# Patient Record
Sex: FEMALE
[De-identification: names, ages, dates, MRNs, and addresses within clinical notes are randomized per-mention and may not be internally consistent; named-entity substitution may affect disease eponyms.]

---

## 2017-10-04 ENCOUNTER — CHARTING TRANS (OUTPATIENT)
Dept: OTHER | Age: 22
End: 2017-10-04

## 2017-10-05 ENCOUNTER — CHARTING TRANS (OUTPATIENT)
Dept: OTHER | Age: 22
End: 2017-10-05

## 2017-10-05 LAB
C TRACH RRNA SPEC QL NAA+PROBE: NEGATIVE
C TRACH RRNA SPEC QL NAA+PROBE: NEGATIVE
N GONORRHOEA RRNA SPEC QL NAA+PROBE: NEGATIVE
N GONORRHOEA RRNA SPEC QL NAA+PROBE: NEGATIVE
SPECIMEN SOURCE: NORMAL
SPECIMEN SOURCE: NORMAL

## 2017-10-09 ENCOUNTER — CHARTING TRANS (OUTPATIENT)
Dept: OTHER | Age: 22
End: 2017-10-09

## 2017-10-09 LAB — CULTURE STREP GRP A (STTH) HL: NORMAL

## 2017-10-10 ENCOUNTER — LAB SERVICES (OUTPATIENT)
Dept: OTHER | Age: 22
End: 2017-10-10

## 2017-10-11 ENCOUNTER — CHARTING TRANS (OUTPATIENT)
Dept: OTHER | Age: 22
End: 2017-10-11

## 2017-10-12 LAB
ALBUMIN SERPL-MCNC: 4.1 G/DL (ref 3.6–5.1)
ALBUMIN/GLOB SERPL: 1.3 (ref 1–2.4)
ALP SERPL-CCNC: 35 UNITS/L (ref 45–117)
ALT SERPL-CCNC: 14 UNITS/L
ANION GAP SERPL CALC-SCNC: 12 MMOL/L (ref 10–20)
AST SERPL-CCNC: 13 UNITS/L
BASOPHILS # BLD: 0 K/MCL (ref 0–0.3)
BASOPHILS NFR BLD: 0 %
BILIRUB SERPL-MCNC: 0.3 MG/DL (ref 0.2–1)
BUN SERPL-MCNC: 9 MG/DL (ref 6–20)
BUN/CREAT SERPL: 15 (ref 7–25)
CALCIUM SERPL-MCNC: 9.7 MG/DL (ref 8.4–10.2)
CHLORIDE SERPL-SCNC: 107 MMOL/L (ref 98–107)
CHOLEST SERPL-MCNC: 193 MG/DL
CHOLEST/HDLC SERPL: 2.9
CO2 SERPL-SCNC: 25 MMOL/L (ref 21–32)
CREAT SERPL-MCNC: 0.61 MG/DL (ref 0.51–0.95)
DIFFERENTIAL METHOD BLD: ABNORMAL
EOSINOPHIL # BLD: 0.1 K/MCL (ref 0.1–0.5)
EOSINOPHIL NFR BLD: 1 %
ERYTHROCYTE [DISTWIDTH] IN BLOOD: 13.6 % (ref 11–15)
FERRITIN SERPL-MCNC: 14 NG/ML (ref 8–252)
GLOBULIN SER-MCNC: 3.2 G/DL (ref 2–4)
GLUCOSE SERPL-MCNC: 92 MG/DL (ref 65–99)
HDLC SERPL-MCNC: 66 MG/DL
HEMATOCRIT: 37.8 % (ref 36–46.5)
HEMOGLOBIN: 12.2 G/DL (ref 12–15.5)
IMM RETICS NFR: 7 % (ref 1.5–16)
IRON SATN MFR SERPL: 23 % (ref 15–45)
IRON SERPL-MCNC: 69 MCG/DL (ref 50–170)
LDLC SERPL CALC-MCNC: 110 MG/DL
LENGTH OF FAST TIME PATIENT: 12 HRS
LENGTH OF FAST TIME PATIENT: 12 HRS
LYMPHOCYTES # BLD: 1.6 K/MCL (ref 1–4.8)
LYMPHOCYTES NFR BLD: 24 %
MEAN CORPUSCULAR HEMOGLOBIN: 25.2 PG (ref 26–34)
MEAN CORPUSCULAR HGB CONC: 32.3 G/DL (ref 32–36.5)
MEAN CORPUSCULAR VOLUME: 77.9 FL (ref 78–100)
MONOCYTES # BLD: 0.4 K/MCL (ref 0.3–0.9)
MONOCYTES NFR BLD: 7 %
NEUTROPHILS # BLD: 4.4 K/MCL (ref 1.8–7.7)
NEUTROPHILS NFR BLD: 68 %
NONHDLC SERPL-MCNC: 127 MG/DL
PLATELET COUNT: 288 K/MCL (ref 140–450)
POTASSIUM SERPL-SCNC: 4.7 MMOL/L (ref 3.4–5.1)
RED CELL COUNT: 4.85 MIL/MCL (ref 4–5.2)
RETICS #: 60 K/MCL (ref 10–120)
RETICS/RBC NFR: 1.2 % (ref 0.3–2.5)
SODIUM SERPL-SCNC: 139 MMOL/L (ref 135–145)
TIBC SERPL-MCNC: 294 MCG/DL (ref 250–450)
TOTAL PROTEIN: 7.3 G/DL (ref 6.4–8.2)
TRIGL SERPL-MCNC: 85 MG/DL
TSH SERPL-ACNC: 1.35 MCUNITS/ML (ref 0.35–5)
WHITE BLOOD COUNT: 6.5 K/MCL (ref 4.2–11)

## 2017-11-13 ENCOUNTER — CHARTING TRANS (OUTPATIENT)
Dept: OTHER | Age: 22
End: 2017-11-13

## 2017-11-14 ENCOUNTER — MYAURORA ACCOUNT LINK (OUTPATIENT)
Dept: OTHER | Age: 22
End: 2017-11-14

## 2017-11-14 ENCOUNTER — CHARTING TRANS (OUTPATIENT)
Dept: OTHER | Age: 22
End: 2017-11-14

## 2018-09-24 ENCOUNTER — LAB SERVICES (OUTPATIENT)
Dept: OTHER | Age: 23
End: 2018-09-24

## 2018-09-24 ENCOUNTER — CHARTING TRANS (OUTPATIENT)
Dept: OTHER | Age: 23
End: 2018-09-24

## 2018-09-24 ENCOUNTER — MYAURORA ACCOUNT LINK (OUTPATIENT)
Dept: OTHER | Age: 23
End: 2018-09-24

## 2018-09-24 LAB
BILIRUBIN: NORMAL
GLUCOSE U: NORMAL
KETONES: NORMAL
LEUKOCYTES: NORMAL
NITRITE: NORMAL
OCCULT BLOOD: NORMAL
PH: 6
PROTEIN: NORMAL
URINE SPEC GRAVITY: 1
UROBILINOGEN: 0.2

## 2018-09-25 ENCOUNTER — CHARTING TRANS (OUTPATIENT)
Dept: OTHER | Age: 23
End: 2018-09-25

## 2018-09-25 LAB
ALBUMIN SERPL-MCNC: 4.2 G/DL (ref 3.6–5.1)
ALBUMIN/GLOB SERPL: 1.4 (ref 1–2.4)
ALP SERPL-CCNC: 38 UNITS/L (ref 45–117)
ALT SERPL-CCNC: 15 UNITS/L
ANION GAP SERPL CALC-SCNC: 14 MMOL/L (ref 10–20)
AST SERPL-CCNC: 12 UNITS/L
BASOPHILS # BLD: 0 K/MCL (ref 0–0.3)
BASOPHILS NFR BLD: 0 %
BILIRUB SERPL-MCNC: 0.4 MG/DL (ref 0.2–1)
BUN SERPL-MCNC: 6 MG/DL (ref 6–20)
BUN/CREAT SERPL: 10 (ref 7–25)
CALCIUM SERPL-MCNC: 9.5 MG/DL (ref 8.4–10.2)
CHLORIDE SERPL-SCNC: 105 MMOL/L (ref 98–107)
CO2 SERPL-SCNC: 24 MMOL/L (ref 21–32)
CREAT SERPL-MCNC: 0.63 MG/DL (ref 0.51–0.95)
DIFFERENTIAL METHOD BLD: ABNORMAL
EOSINOPHIL # BLD: 0 K/MCL (ref 0.1–0.5)
EOSINOPHIL NFR BLD: 1 %
ERYTHROCYTE [DISTWIDTH] IN BLOOD: 13.2 % (ref 11–15)
GLOBULIN SER-MCNC: 2.9 G/DL (ref 2–4)
GLUCOSE SERPL-MCNC: 93 MG/DL (ref 65–99)
HEMATOCRIT: 38.6 % (ref 36–46.5)
HEMOGLOBIN: 11.8 G/DL (ref 12–15.5)
IMM GRANULOCYTES # BLD AUTO: 0 K/MCL (ref 0–0.2)
IMM GRANULOCYTES NFR BLD: 0 %
LENGTH OF FAST TIME PATIENT: 12 HRS
LYMPHOCYTES # BLD: 1.3 K/MCL (ref 1–4.8)
LYMPHOCYTES NFR BLD: 27 %
MEAN CORPUSCULAR HEMOGLOBIN: 25.1 PG (ref 26–34)
MEAN CORPUSCULAR HGB CONC: 30.6 G/DL (ref 32–36.5)
MEAN CORPUSCULAR VOLUME: 82.1 FL (ref 78–100)
MONOCYTES # BLD: 0.3 K/MCL (ref 0.3–0.9)
MONOCYTES NFR BLD: 5 %
NEUTROPHILS # BLD: 3.3 K/MCL (ref 1.8–7.7)
NEUTROPHILS NFR BLD: 67 %
NRBC (NRBCRE): 0 /100 WBC
PLATELET COUNT: 289 K/MCL (ref 140–450)
POTASSIUM SERPL-SCNC: 4 MMOL/L (ref 3.4–5.1)
RED CELL COUNT: 4.7 MIL/MCL (ref 4–5.2)
SODIUM SERPL-SCNC: 139 MMOL/L (ref 135–145)
TOTAL PROTEIN: 7.1 G/DL (ref 6.4–8.2)
WHITE BLOOD COUNT: 5 K/MCL (ref 4.2–11)

## 2018-11-02 VITALS
SYSTOLIC BLOOD PRESSURE: 120 MMHG | TEMPERATURE: 98.4 F | HEIGHT: 61 IN | WEIGHT: 125.22 LBS | HEART RATE: 92 BPM | RESPIRATION RATE: 18 BRPM | DIASTOLIC BLOOD PRESSURE: 72 MMHG | BODY MASS INDEX: 23.64 KG/M2 | OXYGEN SATURATION: 98 %

## 2018-11-02 VITALS
TEMPERATURE: 97.8 F | DIASTOLIC BLOOD PRESSURE: 72 MMHG | WEIGHT: 129.41 LBS | HEART RATE: 81 BPM | SYSTOLIC BLOOD PRESSURE: 116 MMHG | HEIGHT: 61 IN | BODY MASS INDEX: 24.43 KG/M2

## 2018-11-27 VITALS
BODY MASS INDEX: 24.55 KG/M2 | WEIGHT: 130 LBS | HEIGHT: 61 IN | OXYGEN SATURATION: 99 % | TEMPERATURE: 97.5 F | HEART RATE: 98 BPM | RESPIRATION RATE: 18 BRPM | SYSTOLIC BLOOD PRESSURE: 116 MMHG | DIASTOLIC BLOOD PRESSURE: 74 MMHG

## 2019-01-01 ENCOUNTER — EXTERNAL RECORD (OUTPATIENT)
Dept: HEALTH INFORMATION MANAGEMENT | Facility: OTHER | Age: 24
End: 2019-01-01

## 2021-02-03 ENCOUNTER — HOSPITAL ENCOUNTER (EMERGENCY)
Facility: HOSPITAL | Age: 26
Discharge: HOME | End: 2021-02-03
Attending: EMERGENCY MEDICINE
Payer: COMMERCIAL

## 2021-02-03 ENCOUNTER — APPOINTMENT (OUTPATIENT)
Dept: CT IMAGING | Facility: HOSPITAL | Age: 26
End: 2021-02-03
Attending: EMERGENCY MEDICINE
Payer: COMMERCIAL

## 2021-02-03 VITALS
HEART RATE: 84 BPM | SYSTOLIC BLOOD PRESSURE: 120 MMHG | TEMPERATURE: 98 F | BODY MASS INDEX: 24.48 KG/M2 | HEIGHT: 62 IN | DIASTOLIC BLOOD PRESSURE: 68 MMHG | OXYGEN SATURATION: 98 % | RESPIRATION RATE: 16 BRPM | WEIGHT: 133 LBS

## 2021-02-03 DIAGNOSIS — A09 INFECTIOUS ENTERITIS, UNSPECIFIED INFECTIOUS AGENT: Primary | ICD-10-CM

## 2021-02-03 DIAGNOSIS — D72.829 LEUKOCYTOSIS, UNSPECIFIED TYPE: ICD-10-CM

## 2021-02-03 DIAGNOSIS — K52.9 GASTROENTERITIS: ICD-10-CM

## 2021-02-03 DIAGNOSIS — Z20.822 COVID-19 VIRUS NOT DETECTED: ICD-10-CM

## 2021-02-03 LAB
ALBUMIN SERPL-MCNC: 4.6 G/DL (ref 3.4–5)
ALP LIVER SERPL-CCNC: 54 U/L
ALT SERPL-CCNC: 34 U/L
ANION GAP SERPL CALC-SCNC: 9 MMOL/L (ref 0–18)
AST SERPL-CCNC: 19 U/L (ref 15–37)
B-HCG UR QL: NEGATIVE
BASOPHILS # BLD AUTO: 0.05 X10(3) UL (ref 0–0.2)
BASOPHILS NFR BLD AUTO: 0.2 %
BILIRUB DIRECT SERPL-MCNC: <0.1 MG/DL (ref 0–0.2)
BILIRUB SERPL-MCNC: 0.3 MG/DL (ref 0.1–2)
BILIRUB UR QL: NEGATIVE
BUN BLD-MCNC: 15 MG/DL (ref 7–18)
BUN/CREAT SERPL: 17.9 (ref 10–20)
CALCIUM BLD-MCNC: 9.8 MG/DL (ref 8.5–10.1)
CHLORIDE SERPL-SCNC: 108 MMOL/L (ref 98–112)
CO2 SERPL-SCNC: 19 MMOL/L (ref 21–32)
COLOR UR: YELLOW
CREAT BLD-MCNC: 0.84 MG/DL
DEPRECATED RDW RBC AUTO: 38 FL (ref 35.1–46.3)
EOSINOPHIL # BLD AUTO: 0.02 X10(3) UL (ref 0–0.7)
EOSINOPHIL NFR BLD AUTO: 0.1 %
ERYTHROCYTE [DISTWIDTH] IN BLOOD BY AUTOMATED COUNT: 13.2 % (ref 11–15)
GLUCOSE BLD-MCNC: 172 MG/DL (ref 70–99)
GLUCOSE UR-MCNC: NEGATIVE MG/DL
HCT VFR BLD AUTO: 45.6 %
HGB BLD-MCNC: 14.4 G/DL
HYALINE CASTS #/AREA URNS AUTO: 4 /LPF
IMM GRANULOCYTES # BLD AUTO: 0.08 X10(3) UL (ref 0–1)
IMM GRANULOCYTES NFR BLD: 0.4 %
KETONES UR-MCNC: NEGATIVE MG/DL
LIPASE SERPL-CCNC: 550 U/L (ref 73–393)
LYMPHOCYTES # BLD AUTO: 2.73 X10(3) UL (ref 1–4)
LYMPHOCYTES NFR BLD AUTO: 12 %
M PROTEIN MFR SERPL ELPH: 9.1 G/DL (ref 6.4–8.2)
MCH RBC QN AUTO: 25.3 PG (ref 26–34)
MCHC RBC AUTO-ENTMCNC: 31.6 G/DL (ref 31–37)
MCV RBC AUTO: 80.1 FL
MONOCYTES # BLD AUTO: 0.82 X10(3) UL (ref 0.1–1)
MONOCYTES NFR BLD AUTO: 3.6 %
NEUTROPHILS # BLD AUTO: 18.96 X10 (3) UL (ref 1.5–7.7)
NEUTROPHILS # BLD AUTO: 18.96 X10(3) UL (ref 1.5–7.7)
NEUTROPHILS NFR BLD AUTO: 83.7 %
NITRITE UR QL STRIP.AUTO: NEGATIVE
OSMOLALITY SERPL CALC.SUM OF ELEC: 287 MOSM/KG (ref 275–295)
PH UR: 5 [PH] (ref 5–8)
PLATELET # BLD AUTO: 406 10(3)UL (ref 150–450)
POTASSIUM SERPL-SCNC: 3.5 MMOL/L (ref 3.5–5.1)
PROT UR-MCNC: 30 MG/DL
RBC # BLD AUTO: 5.69 X10(6)UL
RBC #/AREA URNS AUTO: 4 /HPF
SARS-COV-2 RNA RESP QL NAA+PROBE: NOT DETECTED
SODIUM SERPL-SCNC: 136 MMOL/L (ref 136–145)
SP GR UR STRIP: 1.03 (ref 1–1.03)
UROBILINOGEN UR STRIP-ACNC: <2
WBC # BLD AUTO: 22.7 X10(3) UL (ref 4–11)
WBC #/AREA URNS AUTO: 2 /HPF

## 2021-02-03 PROCEDURE — 85025 COMPLETE CBC W/AUTO DIFF WBC: CPT | Performed by: EMERGENCY MEDICINE

## 2021-02-03 PROCEDURE — 96361 HYDRATE IV INFUSION ADD-ON: CPT

## 2021-02-03 PROCEDURE — 74177 CT ABD & PELVIS W/CONTRAST: CPT | Performed by: EMERGENCY MEDICINE

## 2021-02-03 PROCEDURE — 81001 URINALYSIS AUTO W/SCOPE: CPT | Performed by: EMERGENCY MEDICINE

## 2021-02-03 PROCEDURE — 96375 TX/PRO/DX INJ NEW DRUG ADDON: CPT

## 2021-02-03 PROCEDURE — S0028 INJECTION, FAMOTIDINE, 20 MG: HCPCS | Performed by: EMERGENCY MEDICINE

## 2021-02-03 PROCEDURE — 80048 BASIC METABOLIC PNL TOTAL CA: CPT | Performed by: EMERGENCY MEDICINE

## 2021-02-03 PROCEDURE — 99284 EMERGENCY DEPT VISIT MOD MDM: CPT

## 2021-02-03 PROCEDURE — 85060 BLOOD SMEAR INTERPRETATION: CPT | Performed by: EMERGENCY MEDICINE

## 2021-02-03 PROCEDURE — 81025 URINE PREGNANCY TEST: CPT

## 2021-02-03 PROCEDURE — 96374 THER/PROPH/DIAG INJ IV PUSH: CPT

## 2021-02-03 PROCEDURE — 83690 ASSAY OF LIPASE: CPT | Performed by: EMERGENCY MEDICINE

## 2021-02-03 PROCEDURE — 80076 HEPATIC FUNCTION PANEL: CPT | Performed by: EMERGENCY MEDICINE

## 2021-02-03 RX ORDER — ONDANSETRON 2 MG/ML
4 INJECTION INTRAMUSCULAR; INTRAVENOUS ONCE
Status: COMPLETED | OUTPATIENT
Start: 2021-02-03 | End: 2021-02-03

## 2021-02-03 RX ORDER — FAMOTIDINE 10 MG/ML
20 INJECTION, SOLUTION INTRAVENOUS ONCE
Status: COMPLETED | OUTPATIENT
Start: 2021-02-03 | End: 2021-02-03

## 2021-02-03 RX ORDER — CIPROFLOXACIN 500 MG/1
500 TABLET, FILM COATED ORAL 2 TIMES DAILY
Qty: 14 TABLET | Refills: 0 | Status: SHIPPED | OUTPATIENT
Start: 2021-02-03 | End: 2021-02-10

## 2021-02-03 RX ORDER — ONDANSETRON 4 MG/1
4 TABLET, ORALLY DISINTEGRATING ORAL EVERY 4 HOURS PRN
Qty: 10 TABLET | Refills: 0 | Status: SHIPPED | OUTPATIENT
Start: 2021-02-03 | End: 2021-02-10

## 2021-02-03 RX ORDER — METRONIDAZOLE 500 MG/1
500 TABLET ORAL 3 TIMES DAILY
Qty: 21 TABLET | Refills: 0 | Status: SHIPPED | OUTPATIENT
Start: 2021-02-03 | End: 2021-02-10

## 2021-02-03 NOTE — ED PROVIDER NOTES
Patient Seen in: [facility] AND [facility] Emergency Department    History   Patient presents with:  Nausea/Vomiting/Diarrhea      HPI     29-year-old female presents the ER with complaint of epigastric pain, nausea, vomiting, diarrhea for the past 2 days.   Zamzam the room.  Personal protective equipment including droplet mask, eye protection, and gloves were worn throughout the duration of the exam.  Handwashing was performed prior to and after the exam.  Stethoscope and any equipment used during my examination was (*)     Leukocyte Esterase Urine Trace (*)     RBC URINE 4 (*)     Bacteria Urine Few (*)     All other components within normal limits   CBC W/ DIFFERENTIAL - Abnormal; Notable for the following components:    WBC 22.7 (*)     RBC 5.69 (*)     MCH 25.3 (* reviewed and interpreted all ED vitals.   I also personally reviewed all labs and imaging if ordered    Pulse Ox: 100%, Room air, Normal     Monitor Interpretation:   normal sinus rhythm    Differential Diagnosis/ Diagnostic Considerations: Gastritis, pancr

## 2021-02-03 NOTE — ED INITIAL ASSESSMENT (HPI)
Pt arrive in ER per [facility] ambulance with c/o nausea/vomiting/diarrhea x 2 days, denies fever, received zofran odt in route

## 2021-02-03 NOTE — H&P
[address] [facility] [address] Gastroenterology                                                                                                  Clinic History and Physical     Pa apnea. Pertinent Family Hx:  - No family hx of esophageal, gastric or colon cancer  - No family history of IBD.     Prior endoscopies:  Denies    Social Hx:  - No smoking  - No etoh  - Denies illicit drug use   - LMP: 1/11/2021  - Occupation: RN - outpt erythema  BEHAVIOR/PSYCH:  negative for psychotic behavior      PHYSICAL EXAM:   Blood pressure 118/81, pulse 89, height 5' 2\" (1.575 m), weight 139 lb (63 kg), last menstrual period 01/11/2021, not currently breastfeeding.     Gen: patient appears comfort lack of evidence for pancreatitis.  + Leukocytosis without anemia. Given that she was recently started on antibiotics, it is difficult to determine how much of her symptoms are acutely infections versus chronic.   Given this, I would recommend continuing o

## 2021-02-04 ENCOUNTER — OFFICE VISIT (OUTPATIENT)
Dept: GASTROENTEROLOGY | Facility: CLINIC | Age: 26
End: 2021-02-04
Payer: COMMERCIAL

## 2021-02-04 VITALS
HEIGHT: 62 IN | WEIGHT: 139 LBS | BODY MASS INDEX: 25.58 KG/M2 | DIASTOLIC BLOOD PRESSURE: 81 MMHG | HEART RATE: 89 BPM | SYSTOLIC BLOOD PRESSURE: 118 MMHG

## 2021-02-04 DIAGNOSIS — R11.2 NAUSEA AND VOMITING, INTRACTABILITY OF VOMITING NOT SPECIFIED, UNSPECIFIED VOMITING TYPE: ICD-10-CM

## 2021-02-04 DIAGNOSIS — R10.13 EPIGASTRIC PAIN: Primary | ICD-10-CM

## 2021-02-04 DIAGNOSIS — R19.7 DIARRHEA, UNSPECIFIED TYPE: ICD-10-CM

## 2021-02-04 PROCEDURE — 3008F BODY MASS INDEX DOCD: CPT | Performed by: NURSE PRACTITIONER

## 2021-02-04 PROCEDURE — 99203 OFFICE O/P NEW LOW 30 MIN: CPT | Performed by: NURSE PRACTITIONER

## 2021-02-04 PROCEDURE — 3074F SYST BP LT 130 MM HG: CPT | Performed by: NURSE PRACTITIONER

## 2021-02-04 PROCEDURE — 3079F DIAST BP 80-89 MM HG: CPT | Performed by: NURSE PRACTITIONER

## 2021-02-04 RX ORDER — LEVONORGESTREL AND ETHINYL ESTRADIOL 0.1-0.02MG
1 KIT ORAL DAILY
COMMUNITY
Start: 2020-09-11 | End: 2021-02-08

## 2021-02-04 RX ORDER — LEVONORGESTREL AND ETHINYL ESTRADIOL 0.1-0.02MG
1 KIT ORAL DAILY
COMMUNITY
Start: 2020-12-22 | End: 2021-05-24 | Stop reason: ALTCHOICE

## 2021-02-04 NOTE — PATIENT INSTRUCTIONS
-Complete course of antibiotics per ED  -Start probiotics  -Contact the office in the next few weeks with a symptom update

## 2021-02-08 ENCOUNTER — OFFICE VISIT (OUTPATIENT)
Dept: INTERNAL MEDICINE CLINIC | Facility: CLINIC | Age: 26
End: 2021-02-08
Payer: COMMERCIAL

## 2021-02-08 VITALS
OXYGEN SATURATION: 99 % | HEIGHT: 62 IN | HEART RATE: 96 BPM | WEIGHT: 142 LBS | SYSTOLIC BLOOD PRESSURE: 120 MMHG | BODY MASS INDEX: 26.13 KG/M2 | DIASTOLIC BLOOD PRESSURE: 70 MMHG

## 2021-02-08 DIAGNOSIS — R06.02 SOB (SHORTNESS OF BREATH): ICD-10-CM

## 2021-02-08 DIAGNOSIS — R19.7 DIARRHEA, UNSPECIFIED TYPE: ICD-10-CM

## 2021-02-08 DIAGNOSIS — R74.8 ELEVATED LIPASE: ICD-10-CM

## 2021-02-08 DIAGNOSIS — Z00.00 ANNUAL PHYSICAL EXAM: Primary | ICD-10-CM

## 2021-02-08 DIAGNOSIS — I88.0 MESENTERIC ADENITIS: ICD-10-CM

## 2021-02-08 DIAGNOSIS — R14.0 ABDOMINAL BLOATING: ICD-10-CM

## 2021-02-08 DIAGNOSIS — R82.90 ABNORMAL URINALYSIS: ICD-10-CM

## 2021-02-08 PROCEDURE — 3078F DIAST BP <80 MM HG: CPT | Performed by: INTERNAL MEDICINE

## 2021-02-08 PROCEDURE — 99385 PREV VISIT NEW AGE 18-39: CPT | Performed by: INTERNAL MEDICINE

## 2021-02-08 PROCEDURE — 3008F BODY MASS INDEX DOCD: CPT | Performed by: INTERNAL MEDICINE

## 2021-02-08 PROCEDURE — 3074F SYST BP LT 130 MM HG: CPT | Performed by: INTERNAL MEDICINE

## 2021-02-08 NOTE — PROGRESS NOTES
Jessica Soto is a 31year old female.     Chief complaint: annual physical exam  HPI:     Jessica Soto is a 31year old female who presents for annual physical exam   Was in the ER recently    had vomiting   Felt Lethargic   Had diarrhea   Abdominal pain   Jason JasonAdrien negatives noted in the the HPI            EXAM:   /70   Pulse 96   Ht 5' 2\" (1.575 m)   Wt 142 lb (64.4 kg)   LMP 01/11/2021   SpO2 99%   BMI 25.97 kg/m²   GENERAL: well developed, well nourished,in no apparent distress  SKIN: no rashes,no suspiciou URINALYSIS WITH CULTURE REFLEX; Future  - EKG 12-LEAD; Future  - XR CHEST PA + LAT CHEST (CPT=71046); Future    3. Mesenteric adenitis  Reviewed   Continue antibiotics   Repeat abnormal blood work  - LIPASE; Future  - SED RATE, WESTERGREN (AUTOMATED);  F[place]u PROTEIN; Future  - HEMOGLOBIN A1C; Future  - CELIAC DISEASE SCREEN REFLEX; Future  - LIPID PANEL; Future  - CBC WITH DIFFERENTIAL WITH PLATELET;  Future  - URINALYSIS WITH CULTURE REFLEX; Future  - TSH W REFLEX TO FREE T4; Future  - GASTRO - INTERNAL  - URI

## 2021-02-16 ENCOUNTER — LAB ENCOUNTER (OUTPATIENT)
Dept: LAB | Facility: HOSPITAL | Age: 26
End: 2021-02-16
Attending: INTERNAL MEDICINE
Payer: COMMERCIAL

## 2021-02-16 ENCOUNTER — HOSPITAL ENCOUNTER (OUTPATIENT)
Dept: GENERAL RADIOLOGY | Facility: HOSPITAL | Age: 26
Discharge: HOME | End: 2021-02-16
Attending: INTERNAL MEDICINE
Payer: COMMERCIAL

## 2021-02-16 DIAGNOSIS — R06.02 SOB (SHORTNESS OF BREATH): ICD-10-CM

## 2021-02-16 DIAGNOSIS — R82.90 ABNORMAL URINALYSIS: ICD-10-CM

## 2021-02-16 DIAGNOSIS — I88.0 MESENTERIC ADENITIS: ICD-10-CM

## 2021-02-16 DIAGNOSIS — Z00.00 ANNUAL PHYSICAL EXAM: ICD-10-CM

## 2021-02-16 DIAGNOSIS — R14.0 ABDOMINAL BLOATING: ICD-10-CM

## 2021-02-16 DIAGNOSIS — M41.80 DEXTROSCOLIOSIS: ICD-10-CM

## 2021-02-16 DIAGNOSIS — R74.8 ELEVATED LIPASE: ICD-10-CM

## 2021-02-16 DIAGNOSIS — R19.7 DIARRHEA, UNSPECIFIED TYPE: ICD-10-CM

## 2021-02-16 DIAGNOSIS — R94.31 ABNORMAL EKG: ICD-10-CM

## 2021-02-16 PROCEDURE — 84443 ASSAY THYROID STIM HORMONE: CPT

## 2021-02-16 PROCEDURE — 83036 HEMOGLOBIN GLYCOSYLATED A1C: CPT

## 2021-02-16 PROCEDURE — 82784 ASSAY IGA/IGD/IGG/IGM EACH: CPT

## 2021-02-16 PROCEDURE — 93010 ELECTROCARDIOGRAM REPORT: CPT | Performed by: INTERNAL MEDICINE

## 2021-02-16 PROCEDURE — 36415 COLL VENOUS BLD VENIPUNCTURE: CPT

## 2021-02-16 PROCEDURE — 85025 COMPLETE CBC W/AUTO DIFF WBC: CPT

## 2021-02-16 PROCEDURE — 82728 ASSAY OF FERRITIN: CPT

## 2021-02-16 PROCEDURE — 85652 RBC SED RATE AUTOMATED: CPT

## 2021-02-16 PROCEDURE — 93005 ELECTROCARDIOGRAM TRACING: CPT

## 2021-02-16 PROCEDURE — 83516 IMMUNOASSAY NONANTIBODY: CPT

## 2021-02-16 PROCEDURE — 81001 URINALYSIS AUTO W/SCOPE: CPT

## 2021-02-16 PROCEDURE — 83540 ASSAY OF IRON: CPT

## 2021-02-16 PROCEDURE — 84466 ASSAY OF TRANSFERRIN: CPT

## 2021-02-16 PROCEDURE — 71046 X-RAY EXAM CHEST 2 VIEWS: CPT | Performed by: INTERNAL MEDICINE

## 2021-02-16 PROCEDURE — 80061 LIPID PANEL: CPT

## 2021-02-16 PROCEDURE — 83690 ASSAY OF LIPASE: CPT

## 2021-02-16 PROCEDURE — 86140 C-REACTIVE PROTEIN: CPT

## 2021-02-18 ENCOUNTER — TELEPHONE (OUTPATIENT)
Dept: INTERNAL MEDICINE CLINIC | Facility: CLINIC | Age: 26
End: 2021-02-18

## 2021-02-18 DIAGNOSIS — R94.31 ABNORMAL EKG: ICD-10-CM

## 2021-02-18 DIAGNOSIS — R06.02 SOB (SHORTNESS OF BREATH): ICD-10-CM

## 2021-02-18 DIAGNOSIS — M41.80 DEXTROSCOLIOSIS: ICD-10-CM

## 2021-02-18 DIAGNOSIS — R82.90 ABNORMAL URINALYSIS: Primary | ICD-10-CM

## 2021-02-18 NOTE — TELEPHONE ENCOUNTER
Wanted to understand what an unspecific T-wave abnormality means and why an Echo was ordered.   Explained that this abnormally could be cause by numerous things such as anxiety, hyperventilation, et al.  The echo will just give a better answer if there is s

## 2021-02-23 ENCOUNTER — PATIENT MESSAGE (OUTPATIENT)
Dept: INTERNAL MEDICINE CLINIC | Facility: CLINIC | Age: 26
End: 2021-02-23

## 2021-02-23 NOTE — TELEPHONE ENCOUNTER
From: Diana Medina  To: Nita Gonzales MD  Sent: 2/23/2021 3:01 AM CST  Subject: Prescription Question    Hi Doctor,    I was on a high dosage of antibiotics for a week cipro flaxin and metronidazole which I would have to take 3 times a day.  On my last day o

## 2021-02-24 RX ORDER — FLUCONAZOLE 150 MG/1
150 TABLET ORAL
Qty: 3 TABLET | Refills: 0 | Status: SHIPPED | OUTPATIENT
Start: 2021-02-24 | End: 2021-05-24 | Stop reason: ALTCHOICE

## 2021-03-24 DIAGNOSIS — Z11.52 ENCOUNTER FOR SCREENING FOR COVID-19: Primary | ICD-10-CM

## 2021-04-01 DIAGNOSIS — Z23 NEED FOR VACCINATION: ICD-10-CM

## 2021-04-16 ENCOUNTER — TELEPHONE (OUTPATIENT)
Dept: INTERNAL MEDICINE CLINIC | Facility: CLINIC | Age: 26
End: 2021-04-16

## 2021-04-16 RX ORDER — DOXYLAMINE SUCCINATE AND PYRIDOXINE HYDROCHLORIDE, DELAYED RELEASE TABLETS 10 MG/10 MG 10; 10 MG/1; MG/1
2 TABLET, DELAYED RELEASE ORAL NIGHTLY
Qty: 120 TABLET | Refills: 0 | Status: SHIPPED | OUTPATIENT
Start: 2021-04-16 | End: 2021-04-16

## 2021-04-16 RX ORDER — ONDANSETRON 4 MG/1
4 TABLET, FILM COATED ORAL EVERY 8 HOURS PRN
Qty: 20 TABLET | Refills: 2 | Status: SHIPPED | OUTPATIENT
Start: 2021-04-16 | End: 2021-04-16 | Stop reason: CLARIF

## 2021-04-16 RX ORDER — DOXYLAMINE SUCCINATE AND PYRIDOXINE HYDROCHLORIDE, DELAYED RELEASE TABLETS 10 MG/10 MG 10; 10 MG/1; MG/1
2 TABLET, DELAYED RELEASE ORAL NIGHTLY
Qty: 20 TABLET | Refills: 0 | Status: SHIPPED | OUTPATIENT
Start: 2021-04-16 | End: 2021-05-24 | Stop reason: ALTCHOICE

## 2021-04-16 NOTE — TELEPHONE ENCOUNTER
Called the patient   She is with nausea and vomiting   I aas not aware that she is pregnant when zofran was sent   Advised not to take zofran since she is pregnant   Discussed the importnace of good hydration   Reports that she has been contacting her OB g

## 2021-04-17 ENCOUNTER — PATIENT MESSAGE (OUTPATIENT)
Dept: INTERNAL MEDICINE CLINIC | Facility: CLINIC | Age: 26
End: 2021-04-17

## 2021-04-17 DIAGNOSIS — Z34.90 PREGNANCY, UNSPECIFIED GESTATIONAL AGE: Primary | ICD-10-CM

## 2021-04-20 NOTE — TELEPHONE ENCOUNTER
Indra Wilkes, [address] 4/19/2021 7:59 AM CDT      ----- Message -----  From: Carine Corona  Sent: 4/17/2021 2:32 AM CDT  To: Lul Castelan Clinical Staff  Subject: Prescription Question     Hi Doctor Meron Soler,    Thank you for your advice.  My doc is not with [facility]

## 2021-05-24 PROBLEM — O99.019 ANTEPARTUM ANEMIA: Status: ACTIVE | Noted: 2021-05-24

## 2021-05-24 PROBLEM — O99.019 ANTEPARTUM ANEMIA (HCC): Status: ACTIVE | Noted: 2021-05-24

## 2021-05-24 PROBLEM — Z34.00 SUPERVISION OF NORMAL FIRST PREGNANCY (HCC): Status: ACTIVE | Noted: 2021-05-24

## 2021-05-24 PROBLEM — Z34.00 SUPERVISION OF NORMAL FIRST PREGNANCY: Status: ACTIVE | Noted: 2021-05-24

## 2021-06-06 DIAGNOSIS — Z34.90 PREGNANCY, UNSPECIFIED GESTATIONAL AGE: ICD-10-CM

## 2021-06-06 DIAGNOSIS — R94.31 ABNORMAL EKG: ICD-10-CM

## 2021-06-06 DIAGNOSIS — R06.02 SOB (SHORTNESS OF BREATH): Primary | ICD-10-CM

## 2021-06-24 ENCOUNTER — HOSPITAL ENCOUNTER (OUTPATIENT)
Dept: CV DIAGNOSTICS | Facility: HOSPITAL | Age: 26
Discharge: HOME | End: 2021-06-24
Attending: INTERNAL MEDICINE
Payer: COMMERCIAL

## 2021-06-24 DIAGNOSIS — Z34.90 PREGNANCY, UNSPECIFIED GESTATIONAL AGE: ICD-10-CM

## 2021-06-24 DIAGNOSIS — R94.31 ABNORMAL EKG: ICD-10-CM

## 2021-06-24 DIAGNOSIS — R06.02 SOB (SHORTNESS OF BREATH): ICD-10-CM

## 2021-06-24 PROCEDURE — 93306 TTE W/DOPPLER COMPLETE: CPT | Performed by: INTERNAL MEDICINE

## 2021-06-25 ENCOUNTER — TELEPHONE (OUTPATIENT)
Dept: INTERNAL MEDICINE CLINIC | Facility: CLINIC | Age: 26
End: 2021-06-25

## 2021-06-28 DIAGNOSIS — I38 VALVULAR HEART DISEASE: ICD-10-CM

## 2021-06-28 DIAGNOSIS — R00.0 TACHYCARDIA: Primary | ICD-10-CM

## 2021-06-30 PROBLEM — Q24.8 CARDIAC VALVULAR MALFORMATION: Status: ACTIVE | Noted: 2021-06-30

## 2021-06-30 PROBLEM — Q24.8 CARDIAC VALVULAR MALFORMATION (HCC): Status: ACTIVE | Noted: 2021-06-30

## 2021-07-01 ENCOUNTER — TELEPHONE (OUTPATIENT)
Dept: CARDIOLOGY | Age: 26
End: 2021-07-01

## 2021-07-16 ENCOUNTER — LAB ENCOUNTER (OUTPATIENT)
Dept: LAB | Facility: HOSPITAL | Age: 26
End: 2021-07-16
Attending: INTERNAL MEDICINE
Payer: COMMERCIAL

## 2021-07-16 DIAGNOSIS — R06.02 SOB (SHORTNESS OF BREATH): ICD-10-CM

## 2021-07-16 DIAGNOSIS — R00.0 TACHYCARDIA: ICD-10-CM

## 2021-07-16 LAB — D DIMER PPP FEU-MCNC: 0.27 UG/ML FEU (ref ?–0.5)

## 2021-07-16 PROCEDURE — 85379 FIBRIN DEGRADATION QUANT: CPT

## 2021-07-16 PROCEDURE — 36415 COLL VENOUS BLD VENIPUNCTURE: CPT

## 2021-07-28 NOTE — PROGRESS NOTES
Outpatient Maternal-Fetal Medicine Consultation    Dear Dr. Genny Mack    Thank you for requesting ultrasound evaluation and maternal fetal medicine consultation on your patient Jessy Blancas.   As you are aware she is a 31year old female  with a single are consistent with the established EDC. No ultrasound evidence of structural abnormalities are seen today. The nasal bone is present. No ultrasound evidence of markers for aneuploidy are seen.  She understands that ultrasound exam cannot exclude genetic ab moderate   regurgitation. Peak gradient (S): 4mm Hg. Tricuspid valve:   Structurally normal valve.   Mobility was not   restricted.  Doppler:   There was no evidence for stenosis.    Mild   regurgitation.      General Principles  Although cardiac disease cardiac output occur, with increases up to 80 percent above third trimester levels in some cases.    Uterine contractions are associated with an increase in cardiac output, attributed to an increase in blood volume from \"autotransfusion\" of intramyometria systemic pressure)    (5)Mild to moderate pulmonary valve stenosis. Assessing Risk - predictors of cardiac events  Women with acquired heart disease may have cardiac complications during pregnancy.  Their risk can be assessed by evaluating the severity o improves the cardiac output, unless the regurgitation is severe.    Chronic mitral regurgitation is also of concern when left atrial enlargement is present, since atrial fibrillation and atrial thrombi may develop with the associated need for anticoagulatio and is recommended because it lowers pain-induced elevations of sympathetic activity, reduces the urge to push, and provides excellent anesthesia for operative procedures.  The epidural should be dosed slowly with local anesthetics to maintain stable hemody avoided in most patients because of the high rate (>10 percent) of vasoconstriction and elevation of systemic pressure. Delivery is associated with hemodynamic changes that can lead to heart failure in women with preexisting cardiovascular disease.  As a r

## 2021-07-29 ENCOUNTER — OFFICE VISIT (OUTPATIENT)
Dept: PERINATAL CARE | Facility: HOSPITAL | Age: 26
End: 2021-07-29
Attending: OBSTETRICS & GYNECOLOGY
Payer: COMMERCIAL

## 2021-07-29 VITALS
WEIGHT: 150 LBS | HEART RATE: 94 BPM | DIASTOLIC BLOOD PRESSURE: 81 MMHG | SYSTOLIC BLOOD PRESSURE: 117 MMHG | BODY MASS INDEX: 27 KG/M2

## 2021-07-29 DIAGNOSIS — Q24.8 CARDIAC VALVULAR MALFORMATION: ICD-10-CM

## 2021-07-29 DIAGNOSIS — Q24.8 CARDIAC VALVULAR MALFORMATION: Primary | ICD-10-CM

## 2021-07-29 PROCEDURE — 76811 OB US DETAILED SNGL FETUS: CPT | Performed by: OBSTETRICS & GYNECOLOGY

## 2021-07-29 PROCEDURE — 99243 OFF/OP CNSLTJ NEW/EST LOW 30: CPT | Performed by: OBSTETRICS & GYNECOLOGY

## 2021-07-29 RX ORDER — ONDANSETRON 4 MG/1
4 TABLET, FILM COATED ORAL EVERY 8 HOURS PRN
COMMUNITY

## 2021-08-02 ENCOUNTER — TELEPHONE (OUTPATIENT)
Dept: PERINATAL CARE | Facility: HOSPITAL | Age: 26
End: 2021-08-02

## 2021-08-12 ENCOUNTER — OFFICE VISIT (OUTPATIENT)
Dept: INTERNAL MEDICINE CLINIC | Facility: CLINIC | Age: 26
End: 2021-08-12
Payer: COMMERCIAL

## 2021-08-12 VITALS
HEIGHT: 62 IN | BODY MASS INDEX: 28.31 KG/M2 | SYSTOLIC BLOOD PRESSURE: 114 MMHG | OXYGEN SATURATION: 98 % | WEIGHT: 153.81 LBS | HEART RATE: 114 BPM | DIASTOLIC BLOOD PRESSURE: 64 MMHG

## 2021-08-12 DIAGNOSIS — R10.13 ABDOMINAL PAIN, EPIGASTRIC: Primary | ICD-10-CM

## 2021-08-12 LAB
ALBUMIN SERPL-MCNC: 3 G/DL (ref 3.4–5)
ALBUMIN/GLOB SERPL: 0.9 {RATIO} (ref 1–2)
ALP LIVER SERPL-CCNC: 49 U/L
ALT SERPL-CCNC: 40 U/L
AMYLASE SERPL-CCNC: 77 U/L (ref 25–115)
ANION GAP SERPL CALC-SCNC: 9 MMOL/L (ref 0–18)
AST SERPL-CCNC: 22 U/L (ref 15–37)
BILIRUB SERPL-MCNC: 0.2 MG/DL (ref 0.1–2)
BUN BLD-MCNC: 6 MG/DL (ref 7–18)
BUN/CREAT SERPL: 16.7 (ref 10–20)
CALCIUM BLD-MCNC: 9 MG/DL (ref 8.5–10.1)
CHLORIDE SERPL-SCNC: 109 MMOL/L (ref 98–112)
CO2 SERPL-SCNC: 19 MMOL/L (ref 21–32)
CREAT BLD-MCNC: 0.36 MG/DL
GLOBULIN PLAS-MCNC: 3.3 G/DL (ref 2.8–4.4)
GLUCOSE BLD-MCNC: 81 MG/DL (ref 70–99)
LIPASE SERPL-CCNC: 145 U/L (ref 73–393)
M PROTEIN MFR SERPL ELPH: 6.3 G/DL (ref 6.4–8.2)
OSMOLALITY SERPL CALC.SUM OF ELEC: 281 MOSM/KG (ref 275–295)
PATIENT FASTING Y/N/NP: NO
POTASSIUM SERPL-SCNC: 3.5 MMOL/L (ref 3.5–5.1)
SODIUM SERPL-SCNC: 137 MMOL/L (ref 136–145)

## 2021-08-12 PROCEDURE — 82150 ASSAY OF AMYLASE: CPT | Performed by: INTERNAL MEDICINE

## 2021-08-12 PROCEDURE — 3008F BODY MASS INDEX DOCD: CPT | Performed by: INTERNAL MEDICINE

## 2021-08-12 PROCEDURE — 3074F SYST BP LT 130 MM HG: CPT | Performed by: INTERNAL MEDICINE

## 2021-08-12 PROCEDURE — 3078F DIAST BP <80 MM HG: CPT | Performed by: INTERNAL MEDICINE

## 2021-08-12 PROCEDURE — 83690 ASSAY OF LIPASE: CPT | Performed by: INTERNAL MEDICINE

## 2021-08-12 PROCEDURE — 80053 COMPREHEN METABOLIC PANEL: CPT | Performed by: INTERNAL MEDICINE

## 2021-08-12 PROCEDURE — 99214 OFFICE O/P EST MOD 30 MIN: CPT | Performed by: INTERNAL MEDICINE

## 2021-08-12 RX ORDER — FAMOTIDINE 10 MG
10 TABLET ORAL AS NEEDED
COMMUNITY

## 2021-08-12 RX ORDER — OMEPRAZOLE 20 MG/1
20 CAPSULE, DELAYED RELEASE ORAL
Qty: 30 CAPSULE | Refills: 1 | Status: SHIPPED | OUTPATIENT
Start: 2021-08-12 | End: 2021-08-26

## 2021-08-12 NOTE — PROGRESS NOTES
HPI/Subjective:   Patient ID: Yari Tomlinson is a 31year old female. Abdominal Pain  Pertinent negatives include no constipation, diarrhea, nausea or vomiting.    Pt here for evaluation of left upperquadrant discomfort  After food intake Had si,michael seamanto Referrals:  None

## 2021-08-26 RX ORDER — OMEPRAZOLE 20 MG/1
20 CAPSULE, DELAYED RELEASE ORAL
Qty: 30 CAPSULE | Refills: 1 | Status: SHIPPED | OUTPATIENT
Start: 2021-08-26 | End: 2021-10-13

## 2021-08-26 NOTE — TELEPHONE ENCOUNTER
Requested Prescriptions     Pending Prescriptions Disp Refills   • omeprazole 20 MG Oral Capsule Delayed Release 30 capsule 1     Sig: Take 1 capsule (20 mg total) by mouth every morning before breakfast.     Last office visit: 8-12-21  Medication last ref

## 2021-09-30 ENCOUNTER — TELEMEDICINE (OUTPATIENT)
Dept: INTERNAL MEDICINE CLINIC | Facility: CLINIC | Age: 26
End: 2021-09-30
Payer: COMMERCIAL

## 2021-09-30 DIAGNOSIS — K92.1 HEMATOCHEZIA: Primary | ICD-10-CM

## 2021-09-30 PROCEDURE — 99213 OFFICE O/P EST LOW 20 MIN: CPT | Performed by: INTERNAL MEDICINE

## 2021-09-30 NOTE — PROGRESS NOTES
Subjective:  Virtual Telephone Check-In    Julissa Collado verbally consents to a Virtual/Telephone Check-In visit on 09/30/21. Patient has been referred to the [facility] website at [email or web address] to review the yearly Consent to Treat document.     Patient Referrals:  None

## 2021-10-13 RX ORDER — OMEPRAZOLE 20 MG/1
CAPSULE, DELAYED RELEASE ORAL
Qty: 30 CAPSULE | Refills: 1 | Status: SHIPPED | OUTPATIENT
Start: 2021-10-13

## 2021-10-13 NOTE — TELEPHONE ENCOUNTER
A refill request was received for:  Requested Prescriptions     Pending Prescriptions Disp Refills   • OMEPRAZOLE 20 MG Oral Capsule Delayed Release [Pharmacy Med Name: OMEPRAZOLE DR 20 MG CAPSULE] 30 capsule 1     Sig: TAKE 1 CAPSULE BY MOUTH EVERY DAY IN

## 2021-11-23 ENCOUNTER — PATIENT MESSAGE (OUTPATIENT)
Dept: INTERNAL MEDICINE CLINIC | Facility: CLINIC | Age: 26
End: 2021-11-23

## 2021-11-23 DIAGNOSIS — Z34.90 PREGNANCY, UNSPECIFIED GESTATIONAL AGE: ICD-10-CM

## 2021-11-23 DIAGNOSIS — M79.89 LEG SWELLING: Primary | ICD-10-CM

## 2021-11-24 ENCOUNTER — HOSPITAL ENCOUNTER (OUTPATIENT)
Dept: ULTRASOUND IMAGING | Facility: HOSPITAL | Age: 26
Discharge: HOME | End: 2021-11-24
Attending: INTERNAL MEDICINE
Payer: COMMERCIAL

## 2021-11-24 DIAGNOSIS — M79.89 LEG SWELLING: ICD-10-CM

## 2021-11-24 DIAGNOSIS — Z34.90 PREGNANCY, UNSPECIFIED GESTATIONAL AGE: ICD-10-CM

## 2021-11-24 PROCEDURE — 93971 EXTREMITY STUDY: CPT | Performed by: INTERNAL MEDICINE

## 2021-11-24 NOTE — TELEPHONE ENCOUNTER
Maury Zapata, [facility] 11/23/2021 12:54 PM CST    Please advise?   ----- Message -----  From: Jewel Jung  Sent: 11/23/2021 12:49 PM CST  To: Lul Castelan Clinical Staff  Subject: Unilateral swelling ankle/foot     Hi Doctor,    I talked to my cardiologist and OB and

## 2022-01-11 ENCOUNTER — OFFICE VISIT (OUTPATIENT)
Dept: INTERNAL MEDICINE CLINIC | Facility: CLINIC | Age: 27
End: 2022-01-11
Payer: COMMERCIAL

## 2022-01-11 VITALS
WEIGHT: 155 LBS | DIASTOLIC BLOOD PRESSURE: 70 MMHG | HEART RATE: 88 BPM | BODY MASS INDEX: 28.52 KG/M2 | OXYGEN SATURATION: 98 % | HEIGHT: 62 IN | SYSTOLIC BLOOD PRESSURE: 110 MMHG

## 2022-01-11 DIAGNOSIS — R07.89 ATYPICAL CHEST PAIN: ICD-10-CM

## 2022-01-11 DIAGNOSIS — R53.83 OTHER FATIGUE: ICD-10-CM

## 2022-01-11 DIAGNOSIS — R06.02 SOB (SHORTNESS OF BREATH): ICD-10-CM

## 2022-01-11 LAB
ALBUMIN SERPL-MCNC: 3.7 G/DL (ref 3.4–5)
ALBUMIN/GLOB SERPL: 1.1 {RATIO} (ref 1–2)
ALP LIVER SERPL-CCNC: 86 U/L
ALT SERPL-CCNC: 21 U/L
ANION GAP SERPL CALC-SCNC: 7 MMOL/L (ref 0–18)
AST SERPL-CCNC: 15 U/L (ref 15–37)
BASOPHILS # BLD AUTO: 0.03 X10(3) UL (ref 0–0.2)
BASOPHILS NFR BLD AUTO: 0.4 %
BILIRUB SERPL-MCNC: 0.3 MG/DL (ref 0.1–2)
BUN BLD-MCNC: 13 MG/DL (ref 7–18)
BUN/CREAT SERPL: 22.4 (ref 10–20)
CALCIUM BLD-MCNC: 9.9 MG/DL (ref 8.5–10.1)
CHLORIDE SERPL-SCNC: 104 MMOL/L (ref 98–112)
CO2 SERPL-SCNC: 27 MMOL/L (ref 21–32)
CREAT BLD-MCNC: 0.58 MG/DL
D DIMER PPP FEU-MCNC: 0.59 UG/ML FEU (ref ?–0.5)
DEPRECATED HBV CORE AB SER IA-ACNC: 38.7 NG/ML
DEPRECATED RDW RBC AUTO: 40 FL (ref 35.1–46.3)
EOSINOPHIL # BLD AUTO: 0.08 X10(3) UL (ref 0–0.7)
EOSINOPHIL NFR BLD AUTO: 1 %
ERYTHROCYTE [DISTWIDTH] IN BLOOD BY AUTOMATED COUNT: 13.1 % (ref 11–15)
FASTING STATUS PATIENT QL REPORTED: NO
FOLATE SERPL-MCNC: 15.3 NG/ML (ref 8.7–?)
GLOBULIN PLAS-MCNC: 3.4 G/DL (ref 2.8–4.4)
GLUCOSE BLD-MCNC: 91 MG/DL (ref 70–99)
HCT VFR BLD AUTO: 39.7 %
HGB BLD-MCNC: 11.9 G/DL
IMM GRANULOCYTES # BLD AUTO: 0.01 X10(3) UL (ref 0–1)
IMM GRANULOCYTES NFR BLD: 0.1 %
IRON SATN MFR SERPL: 24 %
IRON SERPL-MCNC: 86 UG/DL
LYMPHOCYTES # BLD AUTO: 1.78 X10(3) UL (ref 1–4)
LYMPHOCYTES NFR BLD AUTO: 23.3 %
MCH RBC QN AUTO: 25 PG (ref 26–34)
MCHC RBC AUTO-ENTMCNC: 30 G/DL (ref 31–37)
MCV RBC AUTO: 83.4 FL
MONOCYTES # BLD AUTO: 0.41 X10(3) UL (ref 0.1–1)
MONOCYTES NFR BLD AUTO: 5.4 %
NEUTROPHILS # BLD AUTO: 5.34 X10 (3) UL (ref 1.5–7.7)
NEUTROPHILS # BLD AUTO: 5.34 X10(3) UL (ref 1.5–7.7)
NEUTROPHILS NFR BLD AUTO: 69.8 %
OSMOLALITY SERPL CALC.SUM OF ELEC: 286 MOSM/KG (ref 275–295)
PLATELET # BLD AUTO: 344 10(3)UL (ref 150–450)
POTASSIUM SERPL-SCNC: 4.1 MMOL/L (ref 3.5–5.1)
PROT SERPL-MCNC: 7.1 G/DL (ref 6.4–8.2)
RBC # BLD AUTO: 4.76 X10(6)UL
SODIUM SERPL-SCNC: 138 MMOL/L (ref 136–145)
TIBC SERPL-MCNC: 364 UG/DL (ref 240–450)
TRANSFERRIN SERPL-MCNC: 244 MG/DL (ref 200–360)
TSI SER-ACNC: 0.99 MIU/ML (ref 0.36–3.74)
VIT B12 SERPL-MCNC: 1032 PG/ML (ref 193–986)
WBC # BLD AUTO: 7.7 X10(3) UL (ref 4–11)

## 2022-01-11 PROCEDURE — 80050 GENERAL HEALTH PANEL: CPT | Performed by: INTERNAL MEDICINE

## 2022-01-11 PROCEDURE — 3078F DIAST BP <80 MM HG: CPT | Performed by: INTERNAL MEDICINE

## 2022-01-11 PROCEDURE — 84466 ASSAY OF TRANSFERRIN: CPT | Performed by: INTERNAL MEDICINE

## 2022-01-11 PROCEDURE — 82607 VITAMIN B-12: CPT | Performed by: INTERNAL MEDICINE

## 2022-01-11 PROCEDURE — 3008F BODY MASS INDEX DOCD: CPT | Performed by: INTERNAL MEDICINE

## 2022-01-11 PROCEDURE — 99214 OFFICE O/P EST MOD 30 MIN: CPT | Performed by: INTERNAL MEDICINE

## 2022-01-11 PROCEDURE — 85379 FIBRIN DEGRADATION QUANT: CPT | Performed by: INTERNAL MEDICINE

## 2022-01-11 PROCEDURE — 3074F SYST BP LT 130 MM HG: CPT | Performed by: INTERNAL MEDICINE

## 2022-01-11 PROCEDURE — 82746 ASSAY OF FOLIC ACID SERUM: CPT | Performed by: INTERNAL MEDICINE

## 2022-01-11 PROCEDURE — 82728 ASSAY OF FERRITIN: CPT | Performed by: INTERNAL MEDICINE

## 2022-01-11 PROCEDURE — 83540 ASSAY OF IRON: CPT | Performed by: INTERNAL MEDICINE

## 2022-01-11 NOTE — PROGRESS NOTES
Yonas Ferreira is a 31year old female.     Chief complaint: postpartum follow up     HPI:     Yonas Ferreira is a 31year old pleasant female who presents for postpartum follow up     delivered 1 month ago   Baby boy   C section uncomplicated   Still breast feedin systems was completed.   Pertinent positives and negatives noted in the the HPI            EXAM:   /70   Pulse 88   Ht 5' 2\" (1.575 m)   Wt 155 lb (70.3 kg)   LMP 03/06/2021   SpO2 98%   BMI 28.35 kg/m²   GENERAL: well developed, well nourished,in no FREE T4; Future  - D-DIMER; Future  - EKG 12-LEAD; Future  - XR CHEST PA + LAT CHEST (CPT=01056);  Future  - CBC WITH DIFFERENTIAL WITH PLATELET  - COMP METABOLIC PANEL (14)  - FERRITIN  - FOLIC ACID SERUM(FOLATE)  - VITAMIN B12  - IRON AND TIBC  - TSH W RE

## 2022-01-12 ENCOUNTER — LAB ENCOUNTER (OUTPATIENT)
Dept: LAB | Facility: HOSPITAL | Age: 27
End: 2022-01-12
Attending: INTERNAL MEDICINE
Payer: COMMERCIAL

## 2022-01-12 ENCOUNTER — HOSPITAL ENCOUNTER (OUTPATIENT)
Dept: GENERAL RADIOLOGY | Facility: HOSPITAL | Age: 27
Discharge: HOME | End: 2022-01-12
Attending: INTERNAL MEDICINE
Payer: COMMERCIAL

## 2022-01-12 DIAGNOSIS — R53.83 OTHER FATIGUE: ICD-10-CM

## 2022-01-12 DIAGNOSIS — R07.89 ATYPICAL CHEST PAIN: ICD-10-CM

## 2022-01-12 DIAGNOSIS — R06.02 SOB (SHORTNESS OF BREATH): ICD-10-CM

## 2022-01-12 DIAGNOSIS — R79.89 POSITIVE D DIMER: Primary | ICD-10-CM

## 2022-01-12 DIAGNOSIS — R07.9 CHEST PAIN, UNSPECIFIED TYPE: ICD-10-CM

## 2022-01-12 PROCEDURE — 93010 ELECTROCARDIOGRAM REPORT: CPT | Performed by: INTERNAL MEDICINE

## 2022-01-12 PROCEDURE — 93005 ELECTROCARDIOGRAM TRACING: CPT

## 2022-01-12 PROCEDURE — 71046 X-RAY EXAM CHEST 2 VIEWS: CPT | Performed by: INTERNAL MEDICINE

## 2022-01-14 ENCOUNTER — HOSPITAL ENCOUNTER (OUTPATIENT)
Dept: ULTRASOUND IMAGING | Facility: HOSPITAL | Age: 27
Discharge: HOME | End: 2022-01-14
Attending: INTERNAL MEDICINE
Payer: COMMERCIAL

## 2022-01-14 ENCOUNTER — HOSPITAL ENCOUNTER (OUTPATIENT)
Dept: CT IMAGING | Facility: HOSPITAL | Age: 27
Discharge: HOME | End: 2022-01-14
Attending: INTERNAL MEDICINE
Payer: COMMERCIAL

## 2022-01-14 DIAGNOSIS — R07.9 CHEST PAIN, UNSPECIFIED TYPE: ICD-10-CM

## 2022-01-14 DIAGNOSIS — R79.89 POSITIVE D DIMER: ICD-10-CM

## 2022-01-14 DIAGNOSIS — R06.02 SOB (SHORTNESS OF BREATH): ICD-10-CM

## 2022-01-14 PROCEDURE — 71260 CT THORAX DX C+: CPT | Performed by: INTERNAL MEDICINE

## 2022-01-14 PROCEDURE — 93970 EXTREMITY STUDY: CPT | Performed by: INTERNAL MEDICINE

## 2022-04-10 ENCOUNTER — PATIENT MESSAGE (OUTPATIENT)
Dept: INTERNAL MEDICINE CLINIC | Facility: CLINIC | Age: 27
End: 2022-04-10

## 2022-04-13 NOTE — TELEPHONE ENCOUNTER
Tanya Monroe, [place] 4/11/2022 10:04 AM CDT      ----- Message -----  From: Francine Zurita  Sent: 4/10/2022 2:19 PM CDT  To: Lul Castelan Clinical Staff  Subject: Mutiple new nevi Florin Pearson Player always been prone to getting nevi but recently I gained up 14 on my one arm since the beginning of last year. Also one of them have disappeared is this cause for concern ?  Should I follow up with a dermatologist?

## 2022-04-30 ENCOUNTER — PATIENT MESSAGE (OUTPATIENT)
Dept: INTERNAL MEDICINE CLINIC | Facility: CLINIC | Age: 27
End: 2022-04-30

## 2022-06-07 PROBLEM — F80.9 SPEECH AND LANGUAGE DEFICITS: Status: ACTIVE | Noted: 2022-06-07

## 2022-06-13 NOTE — TELEPHONE ENCOUNTER
From: Oumar Kiser  To: Vilma Meredith DO  Sent: 6/11/2022 3:29 AM CDT  Subject: Mri labs     [facility] Doctor I was curious to know if you put in order for me to get my d dimer and tsh drawn?  Also, is there prior authorization for my mri

## 2022-06-21 ENCOUNTER — LAB ENCOUNTER (OUTPATIENT)
Dept: LAB | Facility: HOSPITAL | Age: 27
End: 2022-06-21
Attending: Other
Payer: COMMERCIAL

## 2022-06-21 DIAGNOSIS — F80.9 SPEECH AND LANGUAGE DEFICITS: ICD-10-CM

## 2022-06-21 LAB — TSI SER-ACNC: 1.77 MIU/ML (ref 0.36–3.74)

## 2022-06-21 PROCEDURE — 36415 COLL VENOUS BLD VENIPUNCTURE: CPT | Performed by: OTHER

## 2022-06-21 PROCEDURE — 85379 FIBRIN DEGRADATION QUANT: CPT | Performed by: OTHER

## 2022-06-21 PROCEDURE — 84443 ASSAY THYROID STIM HORMONE: CPT

## 2022-08-17 ENCOUNTER — HOSPITAL ENCOUNTER (EMERGENCY)
Facility: HOSPITAL | Age: 27
Discharge: HOME | End: 2022-08-17
Payer: COMMERCIAL

## 2022-08-17 ENCOUNTER — APPOINTMENT (OUTPATIENT)
Dept: CT IMAGING | Facility: HOSPITAL | Age: 27
End: 2022-08-17
Attending: NURSE PRACTITIONER
Payer: COMMERCIAL

## 2022-08-17 VITALS
DIASTOLIC BLOOD PRESSURE: 75 MMHG | BODY MASS INDEX: 25.76 KG/M2 | RESPIRATION RATE: 16 BRPM | WEIGHT: 140 LBS | HEIGHT: 62 IN | SYSTOLIC BLOOD PRESSURE: 111 MMHG | TEMPERATURE: 98 F | OXYGEN SATURATION: 98 % | HEART RATE: 81 BPM

## 2022-08-17 DIAGNOSIS — A08.4 VIRAL ENTERITIS: Primary | ICD-10-CM

## 2022-08-17 LAB
ADENOVIRUS F 40/41 PCR: NEGATIVE
ALBUMIN SERPL-MCNC: 3.3 G/DL (ref 3.4–5)
ALP LIVER SERPL-CCNC: 48 U/L
ALT SERPL-CCNC: 17 U/L
ANION GAP SERPL CALC-SCNC: 7 MMOL/L (ref 0–18)
AST SERPL-CCNC: 13 U/L (ref 15–37)
ASTROVIRUS PCR: NEGATIVE
B-HCG UR QL: NEGATIVE
BASOPHILS # BLD AUTO: 0.01 X10(3) UL (ref 0–0.2)
BASOPHILS NFR BLD AUTO: 0.2 %
BILIRUB DIRECT SERPL-MCNC: <0.1 MG/DL (ref 0–0.2)
BILIRUB SERPL-MCNC: 0.2 MG/DL (ref 0.1–2)
BILIRUB UR QL: NEGATIVE
BUN BLD-MCNC: 9 MG/DL (ref 7–18)
BUN/CREAT SERPL: 14.1 (ref 10–20)
C CAYETANENSIS DNA SPEC QL NAA+PROBE: NEGATIVE
CALCIUM BLD-MCNC: 8.9 MG/DL (ref 8.5–10.1)
CAMPY SP DNA.DIARRHEA STL QL NAA+PROBE: NEGATIVE
CHLORIDE SERPL-SCNC: 108 MMOL/L (ref 98–112)
CLARITY UR: CLEAR
CO2 SERPL-SCNC: 25 MMOL/L (ref 21–32)
COLOR UR: YELLOW
CREAT BLD-MCNC: 0.64 MG/DL
CRYPTOSP DNA SPEC QL NAA+PROBE: NEGATIVE
DEPRECATED RDW RBC AUTO: 38 FL (ref 35.1–46.3)
EAEC PAA PLAS AGGR+AATA ST NAA+NON-PRB: POSITIVE
EC STX1+STX2 + H7 FLIC SPEC NAA+PROBE: NEGATIVE
ENTAMOEBA HISTOLYTICA PCR: NEGATIVE
EOSINOPHIL # BLD AUTO: 0.06 X10(3) UL (ref 0–0.7)
EOSINOPHIL NFR BLD AUTO: 1.1 %
EPEC EAE GENE STL QL NAA+NON-PROBE: POSITIVE
ERYTHROCYTE [DISTWIDTH] IN BLOOD BY AUTOMATED COUNT: 13.2 % (ref 11–15)
ETEC LTA+ST1A+ST1B TOX ST NAA+NON-PROBE: NEGATIVE
GFR SERPLBLD BASED ON 1.73 SQ M-ARVRAT: 123 ML/MIN/1.73M2 (ref 60–?)
GIARDIA LAMBLIA PCR: NEGATIVE
GLUCOSE BLD-MCNC: 97 MG/DL (ref 70–99)
GLUCOSE UR-MCNC: NEGATIVE MG/DL
HCT VFR BLD AUTO: 38.6 %
HGB BLD-MCNC: 11.9 G/DL
IMM GRANULOCYTES # BLD AUTO: 0.01 X10(3) UL (ref 0–1)
IMM GRANULOCYTES NFR BLD: 0.2 %
KETONES UR-MCNC: NEGATIVE MG/DL
LEUKOCYTE ESTERASE UR QL STRIP.AUTO: NEGATIVE
LIPASE SERPL-CCNC: 208 U/L (ref 73–393)
LYMPHOCYTES # BLD AUTO: 1.62 X10(3) UL (ref 1–4)
LYMPHOCYTES NFR BLD AUTO: 30.9 %
MCH RBC QN AUTO: 24.8 PG (ref 26–34)
MCHC RBC AUTO-ENTMCNC: 30.8 G/DL (ref 31–37)
MCV RBC AUTO: 80.4 FL
MONOCYTES # BLD AUTO: 0.66 X10(3) UL (ref 0.1–1)
MONOCYTES NFR BLD AUTO: 12.6 %
NEUTROPHILS # BLD AUTO: 2.89 X10 (3) UL (ref 1.5–7.7)
NEUTROPHILS # BLD AUTO: 2.89 X10(3) UL (ref 1.5–7.7)
NEUTROPHILS NFR BLD AUTO: 55 %
NITRITE UR QL STRIP.AUTO: NEGATIVE
NOROVIRUS GI/GII PCR: NEGATIVE
OSMOLALITY SERPL CALC.SUM OF ELEC: 289 MOSM/KG (ref 275–295)
P SHIGELLOIDES DNA STL QL NAA+PROBE: NEGATIVE
PH UR: 6 [PH] (ref 5–8)
PLATELET # BLD AUTO: 251 10(3)UL (ref 150–450)
POTASSIUM SERPL-SCNC: 3.6 MMOL/L (ref 3.5–5.1)
PROT SERPL-MCNC: 6.7 G/DL (ref 6.4–8.2)
PROT UR-MCNC: NEGATIVE MG/DL
RBC # BLD AUTO: 4.8 X10(6)UL
ROTAVIRUS A PCR: NEGATIVE
SALMONELLA DNA SPEC QL NAA+PROBE: NEGATIVE
SAPOVIRUS PCR: NEGATIVE
SHIGELLA SP+EIEC IPAH ST NAA+NON-PROBE: NEGATIVE
SODIUM SERPL-SCNC: 140 MMOL/L (ref 136–145)
SP GR UR STRIP: 1.01 (ref 1–1.03)
UROBILINOGEN UR STRIP-ACNC: 0.2
V CHOLERAE DNA SPEC QL NAA+PROBE: NEGATIVE
VIBRIO DNA SPEC NAA+PROBE: NEGATIVE
WBC # BLD AUTO: 5.3 X10(3) UL (ref 4–11)
YERSINIA DNA SPEC NAA+PROBE: NEGATIVE

## 2022-08-17 PROCEDURE — 80048 BASIC METABOLIC PNL TOTAL CA: CPT | Performed by: NURSE PRACTITIONER

## 2022-08-17 PROCEDURE — 96361 HYDRATE IV INFUSION ADD-ON: CPT

## 2022-08-17 PROCEDURE — 85025 COMPLETE CBC W/AUTO DIFF WBC: CPT | Performed by: NURSE PRACTITIONER

## 2022-08-17 PROCEDURE — 81001 URINALYSIS AUTO W/SCOPE: CPT

## 2022-08-17 PROCEDURE — 74177 CT ABD & PELVIS W/CONTRAST: CPT | Performed by: NURSE PRACTITIONER

## 2022-08-17 PROCEDURE — 96360 HYDRATION IV INFUSION INIT: CPT

## 2022-08-17 PROCEDURE — 99284 EMERGENCY DEPT VISIT MOD MDM: CPT

## 2022-08-17 PROCEDURE — 80076 HEPATIC FUNCTION PANEL: CPT | Performed by: NURSE PRACTITIONER

## 2022-08-17 PROCEDURE — 83690 ASSAY OF LIPASE: CPT | Performed by: NURSE PRACTITIONER

## 2022-08-17 PROCEDURE — 81025 URINE PREGNANCY TEST: CPT

## 2022-08-17 PROCEDURE — 87507 IADNA-DNA/RNA PROBE TQ 12-25: CPT | Performed by: NURSE PRACTITIONER

## 2022-08-17 PROCEDURE — 81015 MICROSCOPIC EXAM OF URINE: CPT

## 2022-08-17 RX ORDER — DICYCLOMINE HCL 20 MG
20 TABLET ORAL 4 TIMES DAILY PRN
Qty: 10 TABLET | Refills: 0 | Status: SHIPPED | OUTPATIENT
Start: 2022-08-17 | End: 2022-09-16

## 2022-08-17 RX ORDER — ONDANSETRON 4 MG/1
4 TABLET, ORALLY DISINTEGRATING ORAL EVERY 4 HOURS PRN
Qty: 10 TABLET | Refills: 0 | Status: SHIPPED | OUTPATIENT
Start: 2022-08-17 | End: 2022-08-24

## 2022-08-17 RX ORDER — DICYCLOMINE HCL 20 MG
20 TABLET ORAL ONCE
Status: COMPLETED | OUTPATIENT
Start: 2022-08-17 | End: 2022-08-17

## 2022-08-17 NOTE — ED INITIAL ASSESSMENT (HPI)
N/v/d  With upper abd pain that radiates to back x 2 days. [place].

## 2022-08-18 NOTE — ED QUICK NOTES
Patient safe to DC home per NP. Able to dress self. DC teaching done, instructions reviewed with patient including when and how to follow up with healthcare providers and when to seek emergency care. The patient verbalizes understanding. Peripheral IV discontinued. Patient ambulatory with steady gait to exit.

## 2022-08-19 NOTE — PROGRESS NOTES
Notify patient. Continue symptomatic medications as prescribed. Drink plenty of fluids. Follow-up with your primary physician next week for reevaluation if diarrhea persists.

## 2022-09-01 ENCOUNTER — TELEMEDICINE (OUTPATIENT)
Dept: INTERNAL MEDICINE CLINIC | Facility: CLINIC | Age: 27
End: 2022-09-01

## 2022-09-01 DIAGNOSIS — R06.02 SOB (SHORTNESS OF BREATH): ICD-10-CM

## 2022-09-01 DIAGNOSIS — J98.11 ATELECTASIS: ICD-10-CM

## 2022-09-01 DIAGNOSIS — R31.29 MICROSCOPIC HEMATURIA: Primary | ICD-10-CM

## 2022-09-01 PROCEDURE — 99214 OFFICE O/P EST MOD 30 MIN: CPT | Performed by: INTERNAL MEDICINE

## 2022-10-31 ENCOUNTER — PATIENT MESSAGE (OUTPATIENT)
Dept: NEUROLOGY | Facility: CLINIC | Age: 27
End: 2022-10-31

## 2022-11-01 ENCOUNTER — OFF PREMISE (OUTPATIENT)
Dept: OTHER | Age: 27
End: 2022-11-01

## 2022-11-01 ENCOUNTER — HOSPITAL ENCOUNTER (EMERGENCY)
Facility: HOSPITAL | Age: 27
Discharge: LEFT BEFORE DISCHARGE | End: 2022-11-01
Payer: COMMERCIAL

## 2022-11-01 VITALS
HEART RATE: 100 BPM | WEIGHT: 147 LBS | BODY MASS INDEX: 27 KG/M2 | DIASTOLIC BLOOD PRESSURE: 95 MMHG | SYSTOLIC BLOOD PRESSURE: 145 MMHG | OXYGEN SATURATION: 99 % | RESPIRATION RATE: 18 BRPM | TEMPERATURE: 98 F

## 2022-11-01 PROCEDURE — 93010 ELECTROCARDIOGRAM REPORT: CPT | Performed by: INTERNAL MEDICINE

## 2022-11-01 NOTE — TELEPHONE ENCOUNTER
Spoke to patient who states she has been having constricted pupils for about 3 weeks now. Eye pain comes and goes. Blurry vision started a few days ago. Patient states headache comes and goes as well. Advised patient Dr. Preston Ruiz is not in the office and will not be back until next week. Advised patient to go to the ER and have this evaluated. Patient states she will try to make an appointment with her eye doctor and if unable will come into the ER for an evaluation.

## 2022-11-02 ENCOUNTER — PATIENT MESSAGE (OUTPATIENT)
Dept: NEUROLOGY | Facility: CLINIC | Age: 27
End: 2022-11-02

## 2022-11-03 NOTE — TELEPHONE ENCOUNTER
Luis Eduardo Mathias, DO  You 16 minutes ago (8:17 AM)     Please let him know to see an ophthalmologist ASAP.

## 2022-11-03 NOTE — TELEPHONE ENCOUNTER
Rashida Michaud [place] Nurse (supporting Carley Reza, [place]) 11 hours ago (8:26 PM)     [address],  [place]  I went to ER for extreme headache, blurred vision, eye pain and my pupils were and still are pinpoint (less than 2 mm). They did CT without dye and sent me home cause the CT said nothing indicates a medical emergency. Should I follow up with an eye doc ?

## 2022-11-03 NOTE — TELEPHONE ENCOUNTER
From: Anner January  To: Sangeeta Og DO  Sent: 11/2/2022 8:26 PM CDT  Subject: Extreme headache pinpoint pupils    Jay Akbar,    I went to ER for extreme headache, blurred vision, eye pain and my pupils were and still are pinpoint (less than 2 mm). They did CT without dye and sent me home cause the CT said nothing indicates a medical emergency. Should I follow up with an eye doc ?

## 2022-11-03 NOTE — TELEPHONE ENCOUNTER
Called patient and left detailed message with information below. [place] message was also sent to patient with information.

## 2022-11-04 ENCOUNTER — PATIENT MESSAGE (OUTPATIENT)
Dept: NEUROLOGY | Facility: CLINIC | Age: 27
End: 2022-11-04

## 2022-11-04 DIAGNOSIS — H57.10 PAIN IN EYE, UNSPECIFIED LATERALITY: Primary | ICD-10-CM

## 2022-11-04 NOTE — TELEPHONE ENCOUNTER
Referral pend for ophthalmologist.     Routed to Dr. Naeem Bridges as the covering provider for Dr. Lila Lala. Once order is signed will notify patient.

## 2022-12-11 ENCOUNTER — PATIENT MESSAGE (OUTPATIENT)
Dept: INTERNAL MEDICINE CLINIC | Facility: CLINIC | Age: 27
End: 2022-12-11

## 2022-12-13 ENCOUNTER — PATIENT MESSAGE (OUTPATIENT)
Dept: INTERNAL MEDICINE CLINIC | Facility: CLINIC | Age: 27
End: 2022-12-13

## 2022-12-13 NOTE — TELEPHONE ENCOUNTER
Patient is calling in stating she needs Rx to please be sent to [place] today.      [id number] Mar[place] Downs, [address] [facility] AT [address] [place] [address] Drive, [phone], [address] [place] [address] [address] [address]   Phone: [phone] Fax: [phone]   Hours: Not open 24 hours

## 2022-12-28 PROBLEM — I47.19 ATRIAL TACHYCARDIA (HCC): Status: ACTIVE | Noted: 2021-07-12

## 2022-12-28 PROBLEM — I47.1 ATRIAL TACHYCARDIA (HCC): Status: ACTIVE | Noted: 2021-07-12

## 2022-12-28 PROBLEM — R94.31 ABNORMAL EKG: Status: ACTIVE | Noted: 2021-07-09

## 2022-12-28 PROBLEM — I47.19 ATRIAL TACHYCARDIA: Status: ACTIVE | Noted: 2021-07-12

## 2022-12-29 ENCOUNTER — TELEPHONE (OUTPATIENT)
Dept: INTERNAL MEDICINE CLINIC | Facility: CLINIC | Age: 27
End: 2022-12-29

## 2022-12-29 NOTE — TELEPHONE ENCOUNTER
PRIOR AUTHORIZATION     Medication Name:   semaglutide-weight management 0.25 MG/0.5ML Subcutaneous Solution Auto-injector 2 mL 0 12/13/2022 1/4/2023       Indication per provider: NONE         Received Request: 12/22/2022     Prior authorization Started: 12/28/2022     Outcome: PENDING     Was patient notified?  NO

## 2023-01-04 DIAGNOSIS — Z12.9 SCREENING FOR CANCER: Primary | ICD-10-CM

## 2023-02-01 ENCOUNTER — OFFICE VISIT (OUTPATIENT)
Dept: INTERNAL MEDICINE CLINIC | Facility: CLINIC | Age: 28
End: 2023-02-01
Payer: COMMERCIAL

## 2023-02-01 VITALS
DIASTOLIC BLOOD PRESSURE: 70 MMHG | BODY MASS INDEX: 26.35 KG/M2 | SYSTOLIC BLOOD PRESSURE: 116 MMHG | HEIGHT: 62 IN | WEIGHT: 143.19 LBS | OXYGEN SATURATION: 99 % | HEART RATE: 85 BPM

## 2023-02-01 DIAGNOSIS — Z11.1 SCREENING FOR TUBERCULOSIS: ICD-10-CM

## 2023-02-01 DIAGNOSIS — Z80.8 FAMILY HISTORY OF THYROID CANCER: ICD-10-CM

## 2023-02-01 DIAGNOSIS — Z00.00 ANNUAL PHYSICAL EXAM: Primary | ICD-10-CM

## 2023-02-01 DIAGNOSIS — L65.9 HAIR LOSS: ICD-10-CM

## 2023-02-01 DIAGNOSIS — L65.9 ALOPECIA: ICD-10-CM

## 2023-02-01 LAB
BILIRUB UR QL: NEGATIVE
CLARITY UR: CLEAR
COLOR UR: COLORLESS
GLUCOSE UR-MCNC: NEGATIVE MG/DL
HGB UR QL STRIP.AUTO: NEGATIVE
KETONES UR-MCNC: NEGATIVE MG/DL
LEUKOCYTE ESTERASE UR QL STRIP.AUTO: NEGATIVE
NITRITE UR QL STRIP.AUTO: NEGATIVE
PH UR: 7 [PH] (ref 5–8)
PROT UR-MCNC: NEGATIVE MG/DL
SP GR UR STRIP: 1 (ref 1–1.03)
UROBILINOGEN UR STRIP-ACNC: <2
VIT C UR-MCNC: NEGATIVE MG/DL

## 2023-02-01 PROCEDURE — 3074F SYST BP LT 130 MM HG: CPT | Performed by: INTERNAL MEDICINE

## 2023-02-01 PROCEDURE — 3078F DIAST BP <80 MM HG: CPT | Performed by: INTERNAL MEDICINE

## 2023-02-01 PROCEDURE — 90686 IIV4 VACC NO PRSV 0.5 ML IM: CPT | Performed by: INTERNAL MEDICINE

## 2023-02-01 PROCEDURE — 90471 IMMUNIZATION ADMIN: CPT | Performed by: INTERNAL MEDICINE

## 2023-02-01 PROCEDURE — 3008F BODY MASS INDEX DOCD: CPT | Performed by: INTERNAL MEDICINE

## 2023-02-01 PROCEDURE — 99395 PREV VISIT EST AGE 18-39: CPT | Performed by: INTERNAL MEDICINE

## 2023-02-01 PROCEDURE — 81003 URINALYSIS AUTO W/O SCOPE: CPT | Performed by: INTERNAL MEDICINE

## 2023-02-14 ENCOUNTER — HOSPITAL ENCOUNTER (OUTPATIENT)
Dept: ULTRASOUND IMAGING | Age: 28
Discharge: HOME | End: 2023-02-14
Attending: INTERNAL MEDICINE
Payer: COMMERCIAL

## 2023-02-14 ENCOUNTER — LAB ENCOUNTER (OUTPATIENT)
Dept: LAB | Age: 28
End: 2023-02-14
Attending: INTERNAL MEDICINE
Payer: COMMERCIAL

## 2023-02-14 DIAGNOSIS — Z00.00 ANNUAL PHYSICAL EXAM: ICD-10-CM

## 2023-02-14 DIAGNOSIS — Z80.8 FAMILY HISTORY OF THYROID CANCER: ICD-10-CM

## 2023-02-14 DIAGNOSIS — L65.9 HAIR LOSS: ICD-10-CM

## 2023-02-14 DIAGNOSIS — R31.29 MICROSCOPIC HEMATURIA: ICD-10-CM

## 2023-02-14 DIAGNOSIS — J98.11 ATELECTASIS: ICD-10-CM

## 2023-02-14 DIAGNOSIS — L65.9 ALOPECIA: ICD-10-CM

## 2023-02-14 DIAGNOSIS — R06.02 SOB (SHORTNESS OF BREATH): ICD-10-CM

## 2023-02-14 DIAGNOSIS — R79.89 ABNORMAL CBC: ICD-10-CM

## 2023-02-14 DIAGNOSIS — Z11.1 SCREENING FOR TUBERCULOSIS: ICD-10-CM

## 2023-02-14 LAB
ALBUMIN SERPL-MCNC: 4 G/DL (ref 3.4–5)
ALBUMIN/GLOB SERPL: 1.1 {RATIO} (ref 1–2)
ALP LIVER SERPL-CCNC: 44 U/L
ALT SERPL-CCNC: 55 U/L
ANION GAP SERPL CALC-SCNC: 7 MMOL/L (ref 0–18)
AST SERPL-CCNC: 109 U/L (ref 15–37)
BASOPHILS # BLD AUTO: 0.02 X10(3) UL (ref 0–0.2)
BASOPHILS NFR BLD AUTO: 0.3 %
BILIRUB SERPL-MCNC: 0.4 MG/DL (ref 0.1–2)
BILIRUB UR QL: NEGATIVE
BUN BLD-MCNC: 5 MG/DL (ref 7–18)
BUN/CREAT SERPL: 8.9 (ref 10–20)
CALCIUM BLD-MCNC: 9.5 MG/DL (ref 8.5–10.1)
CHLORIDE SERPL-SCNC: 108 MMOL/L (ref 98–112)
CLARITY UR: CLEAR
CO2 SERPL-SCNC: 25 MMOL/L (ref 21–32)
COLOR UR: COLORLESS
CREAT BLD-MCNC: 0.56 MG/DL
DEPRECATED RDW RBC AUTO: 38.1 FL (ref 35.1–46.3)
EOSINOPHIL # BLD AUTO: 0.01 X10(3) UL (ref 0–0.7)
EOSINOPHIL NFR BLD AUTO: 0.2 %
ERYTHROCYTE [DISTWIDTH] IN BLOOD BY AUTOMATED COUNT: 13.2 % (ref 11–15)
EST. AVERAGE GLUCOSE BLD GHB EST-MCNC: 111 MG/DL (ref 68–126)
FASTING STATUS PATIENT QL REPORTED: NO
GFR SERPLBLD BASED ON 1.73 SQ M-ARVRAT: 127 ML/MIN/1.73M2 (ref 60–?)
GLOBULIN PLAS-MCNC: 3.6 G/DL (ref 2.8–4.4)
GLUCOSE BLD-MCNC: 98 MG/DL (ref 70–99)
GLUCOSE UR-MCNC: NEGATIVE MG/DL
HBA1C MFR BLD: 5.5 % (ref ?–5.7)
HCT VFR BLD AUTO: 38.4 %
HGB BLD-MCNC: 12 G/DL
IMM GRANULOCYTES # BLD AUTO: 0.01 X10(3) UL (ref 0–1)
IMM GRANULOCYTES NFR BLD: 0.2 %
KETONES UR-MCNC: NEGATIVE MG/DL
LEUKOCYTE ESTERASE UR QL STRIP.AUTO: NEGATIVE
LYMPHOCYTES # BLD AUTO: 1.81 X10(3) UL (ref 1–4)
LYMPHOCYTES NFR BLD AUTO: 30.9 %
MCH RBC QN AUTO: 24.9 PG (ref 26–34)
MCHC RBC AUTO-ENTMCNC: 31.3 G/DL (ref 31–37)
MCV RBC AUTO: 79.7 FL
MONOCYTES # BLD AUTO: 0.27 X10(3) UL (ref 0.1–1)
MONOCYTES NFR BLD AUTO: 4.6 %
NEUTROPHILS # BLD AUTO: 3.73 X10 (3) UL (ref 1.5–7.7)
NEUTROPHILS # BLD AUTO: 3.73 X10(3) UL (ref 1.5–7.7)
NEUTROPHILS NFR BLD AUTO: 63.8 %
NITRITE UR QL STRIP.AUTO: NEGATIVE
OSMOLALITY SERPL CALC.SUM OF ELEC: 287 MOSM/KG (ref 275–295)
PH UR: 7 [PH] (ref 5–8)
PLATELET # BLD AUTO: 255 10(3)UL (ref 150–450)
POTASSIUM SERPL-SCNC: 3.8 MMOL/L (ref 3.5–5.1)
PROT SERPL-MCNC: 7.6 G/DL (ref 6.4–8.2)
PROT UR-MCNC: NEGATIVE MG/DL
RBC # BLD AUTO: 4.82 X10(6)UL
SODIUM SERPL-SCNC: 140 MMOL/L (ref 136–145)
SP GR UR STRIP: 1 (ref 1–1.03)
TSI SER-ACNC: 1.56 MIU/ML (ref 0.36–3.74)
UROBILINOGEN UR STRIP-ACNC: <2
VIT B12 SERPL-MCNC: 735 PG/ML (ref 193–986)
VIT C UR-MCNC: NEGATIVE MG/DL
VIT D+METAB SERPL-MCNC: 14.9 NG/ML (ref 30–100)
WBC # BLD AUTO: 5.9 X10(3) UL (ref 4–11)

## 2023-02-14 PROCEDURE — 84443 ASSAY THYROID STIM HORMONE: CPT

## 2023-02-14 PROCEDURE — 84466 ASSAY OF TRANSFERRIN: CPT

## 2023-02-14 PROCEDURE — 86480 TB TEST CELL IMMUN MEASURE: CPT

## 2023-02-14 PROCEDURE — 82308 ASSAY OF CALCITONIN: CPT

## 2023-02-14 PROCEDURE — 80053 COMPREHEN METABOLIC PANEL: CPT

## 2023-02-14 PROCEDURE — 82306 VITAMIN D 25 HYDROXY: CPT

## 2023-02-14 PROCEDURE — 76536 US EXAM OF HEAD AND NECK: CPT | Performed by: INTERNAL MEDICINE

## 2023-02-14 PROCEDURE — 82728 ASSAY OF FERRITIN: CPT

## 2023-02-14 PROCEDURE — 83540 ASSAY OF IRON: CPT

## 2023-02-14 PROCEDURE — 81001 URINALYSIS AUTO W/SCOPE: CPT

## 2023-02-14 PROCEDURE — 83036 HEMOGLOBIN GLYCOSYLATED A1C: CPT

## 2023-02-14 PROCEDURE — 36415 COLL VENOUS BLD VENIPUNCTURE: CPT

## 2023-02-14 PROCEDURE — 82607 VITAMIN B-12: CPT

## 2023-02-14 PROCEDURE — 85025 COMPLETE CBC W/AUTO DIFF WBC: CPT

## 2023-02-15 DIAGNOSIS — R79.89 ABNORMAL CBC: Primary | ICD-10-CM

## 2023-02-15 DIAGNOSIS — R74.8 ELEVATED LIVER ENZYMES: ICD-10-CM

## 2023-02-15 LAB
DEPRECATED HBV CORE AB SER IA-ACNC: 27.1 NG/ML
IRON SATN MFR SERPL: 26 %
IRON SERPL-MCNC: 89 UG/DL
TIBC SERPL-MCNC: 341 UG/DL (ref 240–450)
TRANSFERRIN SERPL-MCNC: 229 MG/DL (ref 200–360)

## 2023-02-16 LAB
CALCITONIN: <2 PG/ML
M TB IFN-G CD4+ T-CELLS BLD-ACNC: 0.01 IU/ML
M TB TUBERC IFN-G BLD QL: NEGATIVE
M TB TUBERC IGNF/MITOGEN IGNF CONTROL: 8.92 IU/ML
QFT TB1 AG MINUS NIL: 0 IU/ML
QFT TB2 AG MINUS NIL: 0.01 IU/ML

## 2023-03-14 ENCOUNTER — PATIENT MESSAGE (OUTPATIENT)
Dept: DERMATOLOGY CLINIC | Facility: CLINIC | Age: 28
End: 2023-03-14

## 2023-03-14 ENCOUNTER — OFFICE VISIT (OUTPATIENT)
Dept: DERMATOLOGY CLINIC | Facility: CLINIC | Age: 28
End: 2023-03-14

## 2023-03-14 DIAGNOSIS — L65.0 TELOGEN EFFLUVIUM: Primary | ICD-10-CM

## 2023-03-14 DIAGNOSIS — L63.9 ALOPECIA AREATA: ICD-10-CM

## 2023-03-14 PROCEDURE — 99203 OFFICE O/P NEW LOW 30 MIN: CPT | Performed by: STUDENT IN AN ORGANIZED HEALTH CARE EDUCATION/TRAINING PROGRAM

## 2023-03-14 PROCEDURE — 11900 INJECT SKIN LESIONS </W 7: CPT | Performed by: STUDENT IN AN ORGANIZED HEALTH CARE EDUCATION/TRAINING PROGRAM

## 2023-03-14 NOTE — TELEPHONE ENCOUNTER
Dr. Bebeto Askew - please see patient's additional question from their office visit today. Thank you.

## 2023-03-23 ENCOUNTER — OFFICE VISIT (OUTPATIENT)
Dept: FAMILY MEDICINE CLINIC | Facility: CLINIC | Age: 28
End: 2023-03-23
Payer: COMMERCIAL

## 2023-03-23 ENCOUNTER — PATIENT MESSAGE (OUTPATIENT)
Dept: INTERNAL MEDICINE CLINIC | Facility: CLINIC | Age: 28
End: 2023-03-23

## 2023-03-23 VITALS
HEART RATE: 78 BPM | WEIGHT: 141 LBS | DIASTOLIC BLOOD PRESSURE: 83 MMHG | BODY MASS INDEX: 25.95 KG/M2 | TEMPERATURE: 98 F | SYSTOLIC BLOOD PRESSURE: 130 MMHG | RESPIRATION RATE: 18 BRPM | HEIGHT: 62 IN | OXYGEN SATURATION: 100 %

## 2023-03-23 DIAGNOSIS — R74.8 ELEVATED LIVER ENZYMES: Primary | ICD-10-CM

## 2023-03-23 DIAGNOSIS — N76.0 VULVOVAGINITIS: Primary | ICD-10-CM

## 2023-03-23 DIAGNOSIS — N89.8 VAGINAL ODOR: ICD-10-CM

## 2023-03-23 LAB
GLUCOSE (URINE DIPSTICK): NEGATIVE MG/DL
KETONES (URINE DIPSTICK): 15 MG/DL
LEUKOCYTES: NEGATIVE
MULTISTIX EXPIRATION DATE: ABNORMAL DATE
MULTISTIX LOT#: ABNORMAL NUMERIC
NITRITE, URINE: NEGATIVE
PH, URINE: 5.5 (ref 4.5–8)
PROTEIN (URINE DIPSTICK): NEGATIVE MG/DL
SPECIFIC GRAVITY: 1.03 (ref 1–1.03)
URINE-COLOR: YELLOW
UROBILINOGEN,SEMI-QN: 0.2 MG/DL (ref 0–1.9)

## 2023-03-23 PROCEDURE — 99213 OFFICE O/P EST LOW 20 MIN: CPT | Performed by: PHYSICIAN ASSISTANT

## 2023-03-23 PROCEDURE — 87591 N.GONORRHOEAE DNA AMP PROB: CPT | Performed by: PHYSICIAN ASSISTANT

## 2023-03-23 PROCEDURE — 81003 URINALYSIS AUTO W/O SCOPE: CPT | Performed by: PHYSICIAN ASSISTANT

## 2023-03-23 PROCEDURE — 87491 CHLMYD TRACH DNA AMP PROBE: CPT | Performed by: PHYSICIAN ASSISTANT

## 2023-03-23 PROCEDURE — 87086 URINE CULTURE/COLONY COUNT: CPT | Performed by: PHYSICIAN ASSISTANT

## 2023-03-23 PROCEDURE — 87808 TRICHOMONAS ASSAY W/OPTIC: CPT | Performed by: PHYSICIAN ASSISTANT

## 2023-03-23 PROCEDURE — 3075F SYST BP GE 130 - 139MM HG: CPT | Performed by: PHYSICIAN ASSISTANT

## 2023-03-23 PROCEDURE — 3008F BODY MASS INDEX DOCD: CPT | Performed by: PHYSICIAN ASSISTANT

## 2023-03-23 PROCEDURE — 87205 SMEAR GRAM STAIN: CPT | Performed by: PHYSICIAN ASSISTANT

## 2023-03-23 PROCEDURE — 87106 FUNGI IDENTIFICATION YEAST: CPT | Performed by: PHYSICIAN ASSISTANT

## 2023-03-23 PROCEDURE — 3079F DIAST BP 80-89 MM HG: CPT | Performed by: PHYSICIAN ASSISTANT

## 2023-03-23 RX ORDER — METRONIDAZOLE 7.5 MG/G
1 GEL VAGINAL NIGHTLY
Qty: 70 G | Refills: 0 | Status: SHIPPED | OUTPATIENT
Start: 2023-03-23 | End: 2023-03-28

## 2023-03-23 RX ORDER — FLUCONAZOLE 150 MG/1
150 TABLET ORAL ONCE
Qty: 2 TABLET | Refills: 0 | Status: SHIPPED | OUTPATIENT
Start: 2023-03-23 | End: 2023-03-23

## 2023-03-24 ENCOUNTER — LAB ENCOUNTER (OUTPATIENT)
Dept: LAB | Facility: HOSPITAL | Age: 28
End: 2023-03-24
Attending: PHYSICIAN ASSISTANT
Payer: COMMERCIAL

## 2023-03-24 DIAGNOSIS — Z11.1 SCREENING FOR TUBERCULOSIS: ICD-10-CM

## 2023-03-24 DIAGNOSIS — R74.8 ELEVATED LIVER ENZYMES: ICD-10-CM

## 2023-03-24 DIAGNOSIS — R79.89 ABNORMAL CBC: ICD-10-CM

## 2023-03-24 DIAGNOSIS — L65.9 HAIR LOSS: ICD-10-CM

## 2023-03-24 DIAGNOSIS — Z80.8 FAMILY HISTORY OF THYROID CANCER: ICD-10-CM

## 2023-03-24 DIAGNOSIS — L65.9 ALOPECIA: ICD-10-CM

## 2023-03-24 DIAGNOSIS — Z00.00 ANNUAL PHYSICAL EXAM: ICD-10-CM

## 2023-03-24 LAB
ALBUMIN SERPL-MCNC: 4.1 G/DL (ref 3.4–5)
ALP LIVER SERPL-CCNC: 43 U/L
ALT SERPL-CCNC: 19 U/L
AST SERPL-CCNC: 13 U/L (ref 15–37)
BASOPHILS # BLD AUTO: 0.03 X10(3) UL (ref 0–0.2)
BASOPHILS NFR BLD AUTO: 0.4 %
BILIRUB DIRECT SERPL-MCNC: 0.1 MG/DL (ref 0–0.2)
BILIRUB SERPL-MCNC: 0.5 MG/DL (ref 0.1–2)
C TRACH DNA SPEC QL NAA+PROBE: NEGATIVE
CHOLEST SERPL-MCNC: 196 MG/DL (ref ?–200)
DEPRECATED RDW RBC AUTO: 38.5 FL (ref 35.1–46.3)
EOSINOPHIL # BLD AUTO: 0.05 X10(3) UL (ref 0–0.7)
EOSINOPHIL NFR BLD AUTO: 0.7 %
ERYTHROCYTE [DISTWIDTH] IN BLOOD BY AUTOMATED COUNT: 13.4 % (ref 11–15)
FASTING PATIENT LIPID ANSWER: YES
HCT VFR BLD AUTO: 37.8 %
HDLC SERPL-MCNC: 75 MG/DL (ref 40–59)
HGB BLD-MCNC: 12.4 G/DL
IMM GRANULOCYTES # BLD AUTO: 0.01 X10(3) UL (ref 0–1)
IMM GRANULOCYTES NFR BLD: 0.1 %
LDLC SERPL CALC-MCNC: 110 MG/DL (ref ?–100)
LYMPHOCYTES # BLD AUTO: 2.49 X10(3) UL (ref 1–4)
LYMPHOCYTES NFR BLD AUTO: 34 %
MCH RBC QN AUTO: 25.9 PG (ref 26–34)
MCHC RBC AUTO-ENTMCNC: 32.8 G/DL (ref 31–37)
MCV RBC AUTO: 79.1 FL
MONOCYTES # BLD AUTO: 0.44 X10(3) UL (ref 0.1–1)
MONOCYTES NFR BLD AUTO: 6 %
N GONORRHOEA DNA SPEC QL NAA+PROBE: NEGATIVE
NEUTROPHILS # BLD AUTO: 4.31 X10 (3) UL (ref 1.5–7.7)
NEUTROPHILS # BLD AUTO: 4.31 X10(3) UL (ref 1.5–7.7)
NEUTROPHILS NFR BLD AUTO: 58.8 %
NONHDLC SERPL-MCNC: 121 MG/DL (ref ?–130)
PLATELET # BLD AUTO: 302 10(3)UL (ref 150–450)
PROT SERPL-MCNC: 7.5 G/DL (ref 6.4–8.2)
RBC # BLD AUTO: 4.78 X10(6)UL
TRIGL SERPL-MCNC: 59 MG/DL (ref 30–149)
VLDLC SERPL CALC-MCNC: 10 MG/DL (ref 0–30)
WBC # BLD AUTO: 7.3 X10(3) UL (ref 4–11)

## 2023-03-24 PROCEDURE — 80061 LIPID PANEL: CPT | Performed by: INTERNAL MEDICINE

## 2023-03-24 PROCEDURE — 85025 COMPLETE CBC W/AUTO DIFF WBC: CPT | Performed by: INTERNAL MEDICINE

## 2023-03-24 PROCEDURE — 80076 HEPATIC FUNCTION PANEL: CPT | Performed by: INTERNAL MEDICINE

## 2023-03-24 NOTE — TELEPHONE ENCOUNTER
Hayes Gallo 3/24/2023 7:30 AM CDT      ----- Message -----  From: Bing Boyer  Sent: 3/23/2023 8:04 PM CDT  To: Lul Castelan Clinical Staff  Subject: Bilirubin in liver     Hello Doctor,    The PA in walk in clinic said it was concerning bilirubin was in urine and that my liver enzymes were elevated last time. I just recently got my urine results. Is this sign of early liver disease?  Also Kendy Pick been having upper right abdominal pain and tender on touch but she said to follow up with you in that as there is nothing they can do for it at the [place] walk in clinic

## 2023-03-25 LAB
GENITAL VAGINOSIS SCREEN: NEGATIVE
TRICHOMONAS SCREEN: NEGATIVE

## 2023-03-29 ENCOUNTER — PATIENT MESSAGE (OUTPATIENT)
Dept: INTERNAL MEDICINE CLINIC | Facility: CLINIC | Age: 28
End: 2023-03-29

## 2023-03-29 RX ORDER — ERGOCALCIFEROL 1.25 MG/1
50000 CAPSULE ORAL WEEKLY
Qty: 12 CAPSULE | Refills: 1 | Status: SHIPPED | OUTPATIENT
Start: 2023-03-29 | End: 2023-06-15

## 2023-03-29 NOTE — TELEPHONE ENCOUNTER
Leonel Mtz, [facility] 3/29/2023 7:45 AM CDT    Dr only sent 4 pills   ----- Message -----  From: Gema Paula  Sent: 3/29/2023 12:48 AM CDT  To: Lul Castelan Clinical Staff  Subject: Vitamin d     Doctor Tobi sent a rx for vitamin d in for me the week you were on vacation. I finished. Do I need a refill?  Or should I take regular otc vitamin d

## 2023-05-04 ENCOUNTER — PATIENT MESSAGE (OUTPATIENT)
Dept: INTERNAL MEDICINE CLINIC | Facility: CLINIC | Age: 28
End: 2023-05-04

## 2023-05-04 DIAGNOSIS — M25.849 CYST OF JOINT OF HAND, UNSPECIFIED LATERALITY: Primary | ICD-10-CM

## 2023-05-09 ENCOUNTER — OFFICE VISIT (OUTPATIENT)
Dept: FAMILY MEDICINE CLINIC | Facility: CLINIC | Age: 28
End: 2023-05-09
Payer: COMMERCIAL

## 2023-05-09 VITALS
SYSTOLIC BLOOD PRESSURE: 118 MMHG | TEMPERATURE: 99 F | OXYGEN SATURATION: 98 % | HEART RATE: 96 BPM | DIASTOLIC BLOOD PRESSURE: 80 MMHG | HEIGHT: 62 IN | WEIGHT: 146.38 LBS | BODY MASS INDEX: 26.94 KG/M2 | RESPIRATION RATE: 18 BRPM

## 2023-05-09 DIAGNOSIS — H66.92 ACUTE LEFT OTITIS MEDIA: Primary | ICD-10-CM

## 2023-05-09 DIAGNOSIS — J02.9 SORE THROAT: ICD-10-CM

## 2023-05-09 LAB
CONTROL LINE PRESENT WITH A CLEAR BACKGROUND (YES/NO): YES YES/NO
KIT LOT #: NORMAL NUMERIC

## 2023-05-09 PROCEDURE — 87081 CULTURE SCREEN ONLY: CPT | Performed by: NURSE PRACTITIONER

## 2023-05-09 PROCEDURE — 99213 OFFICE O/P EST LOW 20 MIN: CPT | Performed by: NURSE PRACTITIONER

## 2023-05-09 PROCEDURE — 87880 STREP A ASSAY W/OPTIC: CPT | Performed by: NURSE PRACTITIONER

## 2023-05-09 RX ORDER — AMOXICILLIN 875 MG/1
875 TABLET, COATED ORAL 2 TIMES DAILY
Qty: 14 TABLET | Refills: 0 | Status: SHIPPED | OUTPATIENT
Start: 2023-05-09 | End: 2023-05-16

## 2023-05-09 RX ORDER — AMOXICILLIN 875 MG/1
875 TABLET, COATED ORAL 2 TIMES DAILY
Qty: 14 TABLET | Refills: 0 | Status: SHIPPED | OUTPATIENT
Start: 2023-05-09 | End: 2023-05-09

## 2023-07-10 ENCOUNTER — PATIENT MESSAGE (OUTPATIENT)
Dept: INTERNAL MEDICINE CLINIC | Facility: CLINIC | Age: 28
End: 2023-07-10

## 2023-07-10 NOTE — TELEPHONE ENCOUNTER
Message routed to Dr. Johnathan Duarte for review and recommendations. Routed to  as well - please update insurance.

## 2023-07-12 ENCOUNTER — TELEPHONE (OUTPATIENT)
Dept: INTERNAL MEDICINE CLINIC | Facility: CLINIC | Age: 28
End: 2023-07-12

## 2023-07-12 DIAGNOSIS — S05.90XA EYE INJURY, UNSPECIFIED LATERALITY, INITIAL ENCOUNTER: Primary | ICD-10-CM

## 2023-07-12 NOTE — TELEPHONE ENCOUNTER
Call placed to patient to discuss referral request and narrow HMO networks w/ [facility]. Unable to reach patient. Left vmail message for pt to call [facility] and see if [facility] is even an option for her. Pt has previously undiagnosed complaint of light flashes/ floaters after accidental eye kick from toddler. Also sent pt MyChart message re: above- requesting confirmation that  [facility] is option and also what specific physician she is requesting the visit with.

## 2023-07-12 NOTE — TELEPHONE ENCOUNTER
Received call from pt. She wants to see an Eye Dr. At [place], [place]: [phone]  Fax [phone].     They are asking for a referral

## 2023-07-12 NOTE — TELEPHONE ENCOUNTER
Eye injury, states child accidentally kicked her in the eye 7/10/23. Saw light flashes. Now eye is watering and painful. Pt requesting referral to [facility] in [facility], Dr. Radha Diaz.  External referral sent

## 2024-02-06 PROCEDURE — 88304 TISSUE EXAM BY PATHOLOGIST: CPT | Performed by: PLASTIC SURGERY

## 2024-02-06 PROCEDURE — 88305 TISSUE EXAM BY PATHOLOGIST: CPT | Performed by: PLASTIC SURGERY

## 2024-06-10 ENCOUNTER — HOSPITAL ENCOUNTER (OUTPATIENT)
Dept: ULTRASOUND IMAGING | Facility: HOSPITAL | Age: 29
Discharge: HOME | End: 2024-06-10
Attending: INTERNAL MEDICINE
Payer: COMMERCIAL

## 2024-06-10 ENCOUNTER — OFFICE VISIT (OUTPATIENT)
Dept: INTERNAL MEDICINE CLINIC | Facility: CLINIC | Age: 29
End: 2024-06-10
Payer: COMMERCIAL

## 2024-06-10 VITALS
BODY MASS INDEX: 26.65 KG/M2 | HEART RATE: 77 BPM | DIASTOLIC BLOOD PRESSURE: 70 MMHG | WEIGHT: 144.81 LBS | HEIGHT: 62 IN | OXYGEN SATURATION: 98 % | SYSTOLIC BLOOD PRESSURE: 110 MMHG

## 2024-06-10 DIAGNOSIS — R10.2 PELVIC PAIN: ICD-10-CM

## 2024-06-10 DIAGNOSIS — N83.209 CYST OF OVARY, UNSPECIFIED LATERALITY: ICD-10-CM

## 2024-06-10 DIAGNOSIS — E55.9 VITAMIN D DEFICIENCY: ICD-10-CM

## 2024-06-10 DIAGNOSIS — Z00.00 ANNUAL PHYSICAL EXAM: Primary | ICD-10-CM

## 2024-06-10 DIAGNOSIS — N92.6 IRREGULAR MENSTRUAL CYCLE: ICD-10-CM

## 2024-06-10 DIAGNOSIS — Z00.00 ANNUAL PHYSICAL EXAM: ICD-10-CM

## 2024-06-10 DIAGNOSIS — R19.00 PELVIC LUMP: ICD-10-CM

## 2024-06-10 DIAGNOSIS — E66.3 OVERWEIGHT (BMI 25.0-29.9): ICD-10-CM

## 2024-06-10 DIAGNOSIS — R11.2 NAUSEA AND VOMITING, UNSPECIFIED VOMITING TYPE: ICD-10-CM

## 2024-06-10 LAB
ALBUMIN SERPL-MCNC: 4.6 G/DL (ref 3.2–4.8)
ALBUMIN/GLOB SERPL: 1.6 {RATIO} (ref 1–2)
ALP LIVER SERPL-CCNC: 38 U/L
ALT SERPL-CCNC: 10 U/L
ANION GAP SERPL CALC-SCNC: 7 MMOL/L (ref 0–18)
AST SERPL-CCNC: 24 U/L (ref ?–34)
BASOPHILS # BLD AUTO: 0.01 X10(3) UL (ref 0–0.2)
BASOPHILS NFR BLD AUTO: 0.2 %
BILIRUB SERPL-MCNC: 0.4 MG/DL (ref 0.3–1.2)
BILIRUB UR QL: NEGATIVE
BUN BLD-MCNC: 8 MG/DL (ref 9–23)
BUN/CREAT SERPL: 12.3 (ref 10–20)
CALCIUM BLD-MCNC: 9.5 MG/DL (ref 8.7–10.4)
CHLORIDE SERPL-SCNC: 107 MMOL/L (ref 98–112)
CHOLEST SERPL-MCNC: 218 MG/DL (ref ?–200)
CLARITY UR: CLEAR
CO2 SERPL-SCNC: 26 MMOL/L (ref 21–32)
COLOR UR: COLORLESS
CREAT BLD-MCNC: 0.65 MG/DL
CRP SERPL-MCNC: <0.4 MG/DL (ref ?–1)
DEPRECATED RDW RBC AUTO: 40.5 FL (ref 35.1–46.3)
EGFRCR SERPLBLD CKD-EPI 2021: 122 ML/MIN/1.73M2 (ref 60–?)
EOSINOPHIL # BLD AUTO: 0.07 X10(3) UL (ref 0–0.7)
EOSINOPHIL NFR BLD AUTO: 1.2 %
ERYTHROCYTE [DISTWIDTH] IN BLOOD BY AUTOMATED COUNT: 13.3 % (ref 11–15)
ERYTHROCYTE [SEDIMENTATION RATE] IN BLOOD: 26 MM/HR
FASTING PATIENT LIPID ANSWER: YES
FASTING STATUS PATIENT QL REPORTED: YES
GLOBULIN PLAS-MCNC: 2.9 G/DL (ref 2–3.5)
GLUCOSE BLD-MCNC: 95 MG/DL (ref 70–99)
GLUCOSE UR-MCNC: NORMAL MG/DL
HCT VFR BLD AUTO: 41.3 %
HDLC SERPL-MCNC: 65 MG/DL (ref 40–59)
HGB BLD-MCNC: 12.7 G/DL
HGB UR QL STRIP.AUTO: NEGATIVE
IGA SERPL-MCNC: 176.1 MG/DL (ref 70–312)
IMM GRANULOCYTES # BLD AUTO: 0.01 X10(3) UL (ref 0–1)
IMM GRANULOCYTES NFR BLD: 0.2 %
KETONES UR-MCNC: NEGATIVE MG/DL
LDLC SERPL CALC-MCNC: 141 MG/DL (ref ?–100)
LEUKOCYTE ESTERASE UR QL STRIP.AUTO: NEGATIVE
LYMPHOCYTES # BLD AUTO: 1.78 X10(3) UL (ref 1–4)
LYMPHOCYTES NFR BLD AUTO: 31.2 %
MCH RBC QN AUTO: 25.5 PG (ref 26–34)
MCHC RBC AUTO-ENTMCNC: 30.8 G/DL (ref 31–37)
MCV RBC AUTO: 82.9 FL
MONOCYTES # BLD AUTO: 0.36 X10(3) UL (ref 0.1–1)
MONOCYTES NFR BLD AUTO: 6.3 %
NEUTROPHILS # BLD AUTO: 3.48 X10 (3) UL (ref 1.5–7.7)
NEUTROPHILS # BLD AUTO: 3.48 X10(3) UL (ref 1.5–7.7)
NEUTROPHILS NFR BLD AUTO: 60.9 %
NITRITE UR QL STRIP.AUTO: NEGATIVE
NONHDLC SERPL-MCNC: 153 MG/DL (ref ?–130)
OSMOLALITY SERPL CALC.SUM OF ELEC: 288 MOSM/KG (ref 275–295)
PH UR: 7 [PH] (ref 5–8)
PLATELET # BLD AUTO: 295 10(3)UL (ref 150–450)
PLATELETS.RETICULATED NFR BLD AUTO: 2.3 % (ref 0–7)
POTASSIUM SERPL-SCNC: 3.8 MMOL/L (ref 3.5–5.1)
PROT SERPL-MCNC: 7.5 G/DL (ref 5.7–8.2)
PROT UR-MCNC: NEGATIVE MG/DL
RBC # BLD AUTO: 4.98 X10(6)UL
SODIUM SERPL-SCNC: 140 MMOL/L (ref 136–145)
SP GR UR STRIP: 1 (ref 1–1.03)
TRIGL SERPL-MCNC: 67 MG/DL (ref 30–149)
TSI SER-ACNC: 1.17 MIU/ML (ref 0.55–4.78)
UROBILINOGEN UR STRIP-ACNC: NORMAL
VIT D+METAB SERPL-MCNC: 21.2 NG/ML (ref 30–100)
VLDLC SERPL CALC-MCNC: 12 MG/DL (ref 0–30)
WBC # BLD AUTO: 5.7 X10(3) UL (ref 4–11)

## 2024-06-10 PROCEDURE — 80061 LIPID PANEL: CPT | Performed by: INTERNAL MEDICINE

## 2024-06-10 PROCEDURE — 82784 ASSAY IGA/IGD/IGG/IGM EACH: CPT | Performed by: INTERNAL MEDICINE

## 2024-06-10 PROCEDURE — 86364 TISS TRNSGLTMNASE EA IG CLAS: CPT | Performed by: INTERNAL MEDICINE

## 2024-06-10 PROCEDURE — 80050 GENERAL HEALTH PANEL: CPT | Performed by: INTERNAL MEDICINE

## 2024-06-10 PROCEDURE — 76856 US EXAM PELVIC COMPLETE: CPT | Performed by: INTERNAL MEDICINE

## 2024-06-10 PROCEDURE — 83036 HEMOGLOBIN GLYCOSYLATED A1C: CPT | Performed by: INTERNAL MEDICINE

## 2024-06-10 PROCEDURE — 85652 RBC SED RATE AUTOMATED: CPT | Performed by: INTERNAL MEDICINE

## 2024-06-10 PROCEDURE — 76830 TRANSVAGINAL US NON-OB: CPT | Performed by: INTERNAL MEDICINE

## 2024-06-10 PROCEDURE — 86140 C-REACTIVE PROTEIN: CPT | Performed by: INTERNAL MEDICINE

## 2024-06-10 PROCEDURE — 82306 VITAMIN D 25 HYDROXY: CPT | Performed by: INTERNAL MEDICINE

## 2024-06-10 PROCEDURE — 84410 TESTOSTERONE BIOAVAILABLE: CPT | Performed by: INTERNAL MEDICINE

## 2024-06-10 PROCEDURE — 81003 URINALYSIS AUTO W/O SCOPE: CPT | Performed by: INTERNAL MEDICINE

## 2024-06-10 NOTE — PROGRESS NOTES
Joan Sandy is a 28 year old female.    Chief complaint: annual physical exam and pelvic lump       HPI:     Joan Sandy is a 28 year old pleasant female who presents for annual physical exam and pelvic lump     Lump in the left LLQ  Hard lump   Painful 4-6/10   Worse to touch  Has been there for 1-2 years   She cannot lay on her stomach anymore   Worse during the cycle  Nausea and vomiting         Incomplete bladder emptying   Wakes up some morning when the bladder is so full and feels like she cannot   Frequency  Sometimes Is is hard to hold it   No blood in the urine or the stool           Period has always been regular until last January   Became irregular   Doesn't skip   Just a week late               No smoking   Never been a smoker   No alcohol   Exercise : 5 times per week         Family history of cancer : fathers side thyroid cancer       Current Outpatient Medications   Medication Sig Dispense Refill    HYDROcodone-acetaminophen 5-325 MG Oral Tab Take 1-2 tablets by mouth every 4 (four) hours as needed for Pain. (Patient not taking: Reported on 6/10/2024) 15 tablet 0    Ferrous Sulfate 325 (65 Fe) MG Oral Tab Take 1 tablet (325 mg total) by mouth daily. (Patient not taking: Reported on 3/23/2023)      Multiple Vitamins-Iron (DAILY-VITAMIN/IRON) Oral Tab Take 1 tablet by mouth. 265mg (Patient not taking: Reported on 6/10/2024)        Past Medical History:    Arrhythmia    Heart disease     Past Surgical History:   Procedure Laterality Date          Tonsillectomy               Family History   Problem Relation Age of Onset    Heart Disease Father     High Cholesterol Father     Cancer Father     Fibromyalgia Mother     Arthritis Mother     High Cholesterol Mother      Patient Active Problem List   Diagnosis    Mesenteric adenitis    Abdominal bloating    Elevated lipase    Supervision of normal first pregnancy (HCC)    Antepartum anemia (HCC)    Cardiac valvular malformation (HCC)    Postpartum  anemia (HCC)    SOB (shortness of breath)    Other fatigue    Atypical chest pain    Speech and language deficits    Abnormal EKG    Atrial tachycardia (HCC)    BMI 25.0-25.9,adult       REVIEW OF SYSTEMS:   A comprehensive 10 point review of systems was completed.  Pertinent positives and negatives noted in the the HPI            EXAM:   /70   Pulse 77   Ht 5' 2\" (1.575 m)   Wt 144 lb 12.8 oz (65.7 kg)   LMP 01/28/2024 (Approximate)   SpO2 98%   BMI 26.48 kg/m²   GENERAL: well developed, well nourished,in no apparent distress  SKIN: no rashes,no suspicious lesions  HEENT: atraumatic, normocephalic,ears and throat are clear  NECK: supple,no adenopathy  Breast : normal no lumps   LUNGS: clear to auscultation  CARDIO: RRR without murmur  GI: no masses, HSM or tenderness  No acute abdominal signs   No hernia   Small palpable lump in pelvic area / LLQ   EXTREMITIES: no cyanosis, clubbing or edema  NEURO: no gross deficits              No orders of the defined types were placed in this encounter.    No results found.         ASSESSMENT AND PLAN:     1. Annual physical exam  Diet and exercise   Self breast exam   Sun screen recommended   Fasting blood work   Pap smear up to date   Follow up with [facility]     - CBC With Differential With Platelet; Future  - Comp Metabolic Panel (14); Future  - Hemoglobin A1C; Future  - Vitamin D; Future  - TSH W Reflex To Free T4; Future  - Sed Rate, Westergren (Automated); Future  - C-Reactive Protein [E]; Future  - Urinalysis with Culture Reflex [E]; Future  - Lipid Panel [E]; Future  - US PELVIS W EV (CPT=76856/45312); Future  - CBC With Differential With Platelet  - Comp Metabolic Panel (14)  - Hemoglobin A1C  - Vitamin D  - TSH W Reflex To Free T4  - Sed Rate, Westergren (Automated)  - C-Reactive Protein [E]  - Urinalysis with Culture Reflex [E]  - Lipid Panel [E]  - Gastro Referral - In Network  - Testosterone,Total and Weakly Bound w/ SHBG; Future  - CELIAC DISEASE SCREEN  Reflex; Future  - Testosterone,Total and Weakly Bound w/ SHBG  - CELIAC DISEASE SCREEN Reflex    2. Vitamin D deficiency  Vitamin drecheck   - CBC With Differential With Platelet; Future  - Comp Metabolic Panel (14); Future  - Hemoglobin A1C; Future  - Vitamin D; Future  - TSH W Reflex To Free T4; Future  - Sed Rate, Westergren (Automated); Future  - C-Reactive Protein [E]; Future  - Urinalysis with Culture Reflex [E]; Future  - Lipid Panel [E]; Future  - US PELVIS W EV (CPT=76856/90517); Future  - CBC With Differential With Platelet  - Comp Metabolic Panel (14)  - Hemoglobin A1C  - Vitamin D  - TSH W Reflex To Free T4  - Sed Rate, Westergren (Automated)  - C-Reactive Protein [E]  - Urinalysis with Culture Reflex [E]  - Lipid Panel [E]  - Gastro Referral - In Network  - Testosterone,Total and Weakly Bound w/ SHBG; Future  - CELIAC DISEASE SCREEN Reflex; Future  - Testosterone,Total and Weakly Bound w/ SHBG  - CELIAC DISEASE SCREEN Reflex    3. Cyst of ovary, unspecified laterality  Us pelvis follow up   - CBC With Differential With Platelet; Future  - Comp Metabolic Panel (14); Future  - Hemoglobin A1C; Future  - Vitamin D; Future  - TSH W Reflex To Free T4; Future  - Sed Rate, Westergren (Automated); Future  - C-Reactive Protein [E]; Future  - Urinalysis with Culture Reflex [E]; Future  - Lipid Panel [E]; Future  - US PELVIS W EV (CPT=76686/19532); Future  - CBC With Differential With Platelet  - Comp Metabolic Panel (14)  - Hemoglobin A1C  - Vitamin D  - TSH W Reflex To Free T4  - Sed Rate, Westergren (Automated)  - C-Reactive Protein [E]  - Urinalysis with Culture Reflex [E]  - Lipid Panel [E]  - Gastro Referral - In Network  - Testosterone,Total and Weakly Bound w/ SHBG; Future  - CELIAC DISEASE SCREEN Reflex; Future  - Testosterone,Total and Weakly Bound w/ SHBG  - CELIAC DISEASE SCREEN Reflex    4. Pelvic pain E/M  UA,   US pelvis     - CBC With Differential With Platelet; Future  - Comp Metabolic Panel (14);  Future  - Hemoglobin A1C; Future  - Vitamin D; Future  - TSH W Reflex To Free T4; Future  - Sed Rate, Westergren (Automated); Future  - C-Reactive Protein [E]; Future  - Urinalysis with Culture Reflex [E]; Future  - Lipid Panel [E]; Future  - US PELVIS W EV (CPT=76856/09294); Future  - CBC With Differential With Platelet  - Comp Metabolic Panel (14)  - Hemoglobin A1C  - Vitamin D  - TSH W Reflex To Free T4  - Sed Rate, Westergren (Automated)  - C-Reactive Protein [E]  - Urinalysis with Culture Reflex [E]  - Lipid Panel [E]  - Gastro Referral - In Network  - Testosterone,Total and Weakly Bound w/ SHBG; Future  - CELIAC DISEASE SCREEN Reflex; Future  - Testosterone,Total and Weakly Bound w/ SHBG  - CELIAC DISEASE SCREEN Reflex    5. Pelvic lump E/M  US pelvis   Esr and CRP   Cbc   If unrevealing or abnormal blood work will do a CT scan abdomen and pelvis     - CBC With Differential With Platelet; Future  - Comp Metabolic Panel (14); Future  - Hemoglobin A1C; Future  - Vitamin D; Future  - TSH W Reflex To Free T4; Future  - Sed Rate, Westergren (Automated); Future  - C-Reactive Protein [E]; Future  - Urinalysis with Culture Reflex [E]; Future  - Lipid Panel [E]; Future  - US PELVIS W EV (CPT=76856/02732); Future  - CBC With Differential With Platelet  - Comp Metabolic Panel (14)  - Hemoglobin A1C  - Vitamin D  - TSH W Reflex To Free T4  - Sed Rate, Westergren (Automated)  - C-Reactive Protein [E]  - Urinalysis with Culture Reflex [E]  - Lipid Panel [E]  - Gastro Referral - In Network  - Testosterone,Total and Weakly Bound w/ SHBG; Future  - CELIAC DISEASE SCREEN Reflex; Future  - Testosterone,Total and Weakly Bound w/ SHBG  - CELIAC DISEASE SCREEN Reflex    6. Overweight (BMI 25.0-29.9)  Advised to schedule a new weight loss visit if interested   - CBC With Differential With Platelet; Future  - Comp Metabolic Panel (14); Future  - Hemoglobin A1C; Future  - Vitamin D; Future  - TSH W Reflex To Free T4; Future  - Sed  Rate, Westergren (Automated); Future  - C-Reactive Protein [E]; Future  - Urinalysis with Culture Reflex [E]; Future  - Lipid Panel [E]; Future  - US PELVIS W EV (CPT=76856/84389); Future  - CBC With Differential With Platelet  - Comp Metabolic Panel (14)  - Hemoglobin A1C  - Vitamin D  - TSH W Reflex To Free T4  - Sed Rate, Westergren (Automated)  - C-Reactive Protein [E]  - Urinalysis with Culture Reflex [E]  - Lipid Panel [E]  - Gastro Referral - In Network  - Testosterone,Total and Weakly Bound w/ SHBG; Future  - CELIAC DISEASE SCREEN Reflex; Future  - Testosterone,Total and Weakly Bound w/ SHBG  - CELIAC DISEASE SCREEN Reflex    7. Nausea and vomiting, unspecified vomiting type E/M  Referral to GI   Celiac panel   Cbc , cmp      - CBC With Differential With Platelet; Future  - Comp Metabolic Panel (14); Future  - Hemoglobin A1C; Future  - Vitamin D; Future  - TSH W Reflex To Free T4; Future  - Sed Rate, Westergren (Automated); Future  - C-Reactive Protein [E]; Future  - Urinalysis with Culture Reflex [E]; Future  - Lipid Panel [E]; Future  - US PELVIS W EV (CPT=76856/14745); Future  - CBC With Differential With Platelet  - Comp Metabolic Panel (14)  - Hemoglobin A1C  - Vitamin D  - TSH W Reflex To Free T4  - Sed Rate, Westergren (Automated)  - C-Reactive Protein [E]  - Urinalysis with Culture Reflex [E]  - Lipid Panel [E]  - Gastro Referral - In Network  - Testosterone,Total and Weakly Bound w/ SHBG; Future  - CELIAC DISEASE SCREEN Reflex; Future  - Testosterone,Total and Weakly Bound w/ SHBG  - CELIAC DISEASE SCREEN Reflex    8. Irregular menstrual cycle E/M  Bc , cmp, testosterone   Tsh level     - CBC With Differential With Platelet; Future  - Comp Metabolic Panel (14); Future  - Hemoglobin A1C; Future  - Vitamin D; Future  - TSH W Reflex To Free T4; Future  - Sed Rate, Westergren (Automated); Future  - C-Reactive Protein [E]; Future  - Urinalysis with Culture Reflex [E]; Future  - Lipid Panel [E]; Future  -  US PELVIS W EV (CPT=76856/38283); Future  - CBC With Differential With Platelet  - Comp Metabolic Panel (14)  - Hemoglobin A1C  - Vitamin D  - TSH W Reflex To Free T4  - Sed Rate, Westergren (Automated)  - C-Reactive Protein [E]  - Urinalysis with Culture Reflex [E]  - Lipid Panel [E]  - Gastro Referral - In Network  - Testosterone,Total and Weakly Bound w/ SHBG; Future  - CELIAC DISEASE SCREEN Reflex; Future  - Testosterone,Total and Weakly Bound w/ SHBG  - CELIAC DISEASE SCREEN Reflex       Discussed E/M code + Z 00 code since with other complaints / work up   Please return to the clinic if you are having persistent symptoms. If worsening symptoms should go to the ER    Charanjit Arnett MD,   Diplomate of the American Board of Internal Medicine  Diplomate of the American Board of Obesity Medicine

## 2024-06-11 ENCOUNTER — MED REC SCAN ONLY (OUTPATIENT)
Dept: INTERNAL MEDICINE CLINIC | Facility: CLINIC | Age: 29
End: 2024-06-11

## 2024-06-11 DIAGNOSIS — R22.2 SUBCUTANEOUS NODULE OF ABDOMINAL WALL: Primary | ICD-10-CM

## 2024-06-11 LAB
EST. AVERAGE GLUCOSE BLD GHB EST-MCNC: 117 MG/DL (ref 68–126)
HBA1C MFR BLD: 5.7 % (ref ?–5.7)
TTG IGA SER-ACNC: 0.2 U/ML (ref ?–7)

## 2024-06-12 ENCOUNTER — TELEPHONE (OUTPATIENT)
Dept: SURGERY | Facility: CLINIC | Age: 29
End: 2024-06-12

## 2024-06-12 NOTE — TELEPHONE ENCOUNTER
Spoke to patient regarding message left with PSR's. Confirmed US report and images available for Dr. Denise. Notified patient that Dr. Denise will review and discuss at scheduled appointment on 6/18/24. All questions answered, advised to call back for any future questions or concerns. Patient verbalized understanding.

## 2024-06-14 LAB
SEX HORM BIND GLOB: 46.8 NMOL/L
TESTOST % FREE+WEAK BND: 11.7 %
TESTOST FREE+WEAK BND: 2.5 NG/DL
TESTOSTERONE TOT /MS: 21.4 NG/DL

## 2024-06-18 ENCOUNTER — OFFICE VISIT (OUTPATIENT)
Dept: SURGERY | Facility: CLINIC | Age: 29
End: 2024-06-18

## 2024-06-18 VITALS
DIASTOLIC BLOOD PRESSURE: 84 MMHG | TEMPERATURE: 98 F | BODY MASS INDEX: 26 KG/M2 | RESPIRATION RATE: 20 BRPM | WEIGHT: 142.38 LBS | SYSTOLIC BLOOD PRESSURE: 127 MMHG | HEART RATE: 84 BPM

## 2024-06-18 DIAGNOSIS — R19.04 ABDOMINAL WALL MASS OF LEFT LOWER QUADRANT: Primary | ICD-10-CM

## 2024-06-18 PROCEDURE — 3079F DIAST BP 80-89 MM HG: CPT | Performed by: SURGERY

## 2024-06-18 PROCEDURE — 3074F SYST BP LT 130 MM HG: CPT | Performed by: SURGERY

## 2024-06-18 PROCEDURE — 99244 OFF/OP CNSLTJ NEW/EST MOD 40: CPT | Performed by: SURGERY

## 2024-06-26 NOTE — CONSULTS
[facility]    Patient Name:  Joan Sandy   YOB: 1995   Gender:  Female   Appt Date:  6/18/2024   Provider:  Minh Denise MD   Insurance:  BCBS PPO     PATIENT PROVIDERS  Referring Provider: No ref. provider found   Address: No referring provider defined for this encounter.   Phone #: N/A    Primary Care Provider:Charanjit Arnett MD   Address: [address] [place] [address]   Phone #: [phone]       CHIEF COMPLAINT  Chief Complaint   Patient presents with    Consult        PROBLEMS  Reviewed   Patient Active Problem List   Diagnosis    Mesenteric adenitis    Abdominal bloating    Elevated lipase    Supervision of normal first pregnancy (HCC)    Antepartum anemia (HCC)    Cardiac valvular malformation (HCC)    Postpartum anemia (HCC)    SOB (shortness of breath)    Other fatigue    Atypical chest pain    Speech and language deficits    Abnormal EKG    Atrial tachycardia (HCC)    BMI 25.0-25.9,adult    Overweight (BMI 25.0-29.9)    Pelvic lump    Pelvic pain    Cyst of ovary    Vitamin D deficiency    Annual physical exam    Irregular menstrual cycle    Nausea and vomiting        History of Present Illness:  Joan Sandy is a 28 year old Female without significant medical history who is being seen in the surgical oncology clinic to render an opinion regarding the surgical management of left lower quadrant abdominal wall lesion.    Patient presented to her PCP Dr. Arnett for this lump, present for about 1-2 years. Described as hard, painful with palpation worse during her menstrual cycle. US pelvis on 6/10 notes hypoechoic subcutaneous nodule 1.2 x 1.2cm with small vascular pedicle. Differential includes atypical sebaceous cyst or superficial abdominal wall lymph node    Patient presents to discuss surgical options.     Vital Signs:  /84 (BP Location: Right arm, Patient Position: Sitting, Cuff Size: adult)   Pulse 84   Temp 98.3 °F (36.8 °C)  (Temporal)   Resp 20   Wt 64.6 kg (142 lb 6.4 oz)   LMP 2024 (Approximate)   BMI 26.05 kg/m²      Medications Reviewed:    Current Outpatient Medications:     Ferrous Sulfate 325 (65 Fe) MG Oral Tab, Take 1 tablet (325 mg total) by mouth daily., Disp: , Rfl:     Multiple Vitamins-Iron (DAILY-VITAMIN/IRON) Oral Tab, Take 1 tablet by mouth. 265mg, Disp: , Rfl:      Allergies Reviewed:  Allergies   Allergen Reactions    Codeine HIVES and OTHER (SEE COMMENTS)        History:  Reviewed:  Past Medical History:    Arrhythmia    Heart disease      Reviewed:  Past Surgical History:   Procedure Laterality Date          Tonsillectomy        Reviewed Social History:  Social History     Socioeconomic History    Marital status:    Tobacco Use    Smoking status: Never     Passive exposure: Never    Smokeless tobacco: Never   Vaping Use    Vaping status: Never Used   Substance and Sexual Activity    Alcohol use: Not Currently    Drug use: Never    Sexual activity: Yes     Partners: Male   Other Topics Concern    Breast feeding No    Reaction to local anesthetic No    Pt has a pacemaker No    Pt has a defibrillator No      Reviewed:  Family History   Problem Relation Age of Onset    Heart Disease Father     High Cholesterol Father     Cancer Father     Fibromyalgia Mother     Arthritis Mother     High Cholesterol Mother         Review of Systems:  Review of Systems   Constitutional:  Negative for activity change, appetite change, chills, fatigue, fever and unexpected weight change.   HENT:  Negative for congestion and trouble swallowing.    Eyes:  Negative for discharge and redness.   Respiratory:  Negative for cough, chest tightness and shortness of breath.    Cardiovascular:  Negative for chest pain and leg swelling.   Gastrointestinal:  Negative for abdominal distention, abdominal pain and nausea.   Endocrine: Negative for polydipsia and polyuria.   Genitourinary:  Negative for difficulty urinating  and dysuria.   Musculoskeletal:  Negative for myalgias.   Skin:  Negative for color change and pallor.   Allergic/Immunologic: Negative for immunocompromised state.   Neurological:  Negative for syncope and weakness.   Hematological:  Does not bruise/bleed easily.   Psychiatric/Behavioral:  Negative for agitation and confusion.         Physical Examination:   Physical Exam  Constitutional:       Appearance: She is well-developed.   HENT:      Head: Normocephalic.   Eyes:      Pupils: Pupils are equal, round, and reactive to light.   Cardiovascular:      Rate and Rhythm: Normal rate and regular rhythm.      Heart sounds: No murmur heard.  Pulmonary:      Effort: Pulmonary effort is normal. No respiratory distress.      Breath sounds: Normal breath sounds. No wheezing or rales.   Abdominal:      General: There is no distension.      Palpations: Abdomen is soft.      Tenderness: There is no abdominal tenderness.   Musculoskeletal:      Cervical back: Normal range of motion.   Skin:     General: Skin is warm and dry.   Neurological:      Mental Status: She is alert and oriented to person, place, and time.      LLQ subcutaneous nodule, deep     Document Review:  US Pelvis - 6/10/2024  FINDINGS:  UTERUS:   Measures 7.8 x 3.4 x 4.6 cm     ENDOMETRIUM: Endometrial thickness 10.0 mm.  No fluid or mass in the endometrial canal.    MYOMETRIUM: Normal echogenicity.  No masses.       OVARIES AND ADNEXA:     RIGHT:   Measures 2.6 x 1.8 x 2.4 cm. Small subcentimeter physiologic follicles.  LEFT:   Measures 3.2 x 2.4 x 2.8 cm. Small subcentimeter physiologic follicles.  Largest benign cyst measuring 2.0 x 1.3 cm     CUL-DE-SAC:   Normal.  No free fluid or mass.    OTHER:  Hypoechoic subcutaneous nodule measures 1.2 x 1.2 cm lower anterior abdominal wall left lower quadrant region accounting for palpable area with small vascular pedicle leading up to this lesion.  Findings may reflect an atypical sebaceous cyst  versus small  superficial abdominal wall lymph node         Impression   CONCLUSION:  1. Normal pelvic ultrasound examination.  Normal endometrial thickness.  2. Normal bilateral ovaries with simple subcentimeter follicles.  Largest benign follicular cyst left ovary measures 2.0 x 1.3 cm.  3. No adnexal mass or free pelvic fluid.  4. Hypoechoic subcutaneous nodule (left lower quadrant anterior abdominal wall accounting for patient's palpable abnormality appears to contain a small vascular pedicle.  Findings may reflect an atypical sebaceous cyst versus small superficial dominant  wall lymph node.        Assessment / Plan:    ICD-10-CM    1. Abdominal wall mass of left lower quadrant  R19.04 IR BIOPSY     Reassured patient  Options presented, likely endometrioma vs angiolipoma. Could proceed with excision, surveillance or biopsy. Agreed to biopsy first and make plan afterwards.  Follow up after pathology returns to discuss next steps.    Minh Denise MD  Complex General Surgical Oncology  [email or web address], [facility]  [email or web address]

## 2024-06-27 RX ORDER — ERGOCALCIFEROL 1.25 MG/1
50000 CAPSULE ORAL WEEKLY
Qty: 12 CAPSULE | Refills: 1 | Status: SHIPPED | OUTPATIENT
Start: 2024-06-27

## 2024-06-27 NOTE — TELEPHONE ENCOUNTER
MEDICATION REFILL REQUEST:    Future Appointment: 10/29/2024    Last appointment: 06/10/2024    Medication requested:   Requested Prescriptions     Pending Prescriptions Disp Refills    ERGOCALCIFEROL 1.25 MG (40441 UT) Oral Cap [Pharmacy Med Name: VITAMIN D2 50,000IU (ERGO) CAP RX] 12 capsule 1     Sig: TAKE 1 CAPSULE BY MOUTH 1 TIME A WEEK FOR 12 DOSES         Last refill date:   ergocalciferol 1.25 MG (02026 UT) Oral Cap 12 capsule 1 3/29/2023 6/15/2023    Sig - Route: Take 1 capsule (50,000 Units total) by mouth once a week for 12 doses. - Oral    Sent to pharmacy as: Vitamin D (Ergocalciferol) 1.25 MG (73444 UT) Oral Capsule (Vitamin D2)    E-Prescribing Status: Receipt confirmed by pharmacy (3/29/2023  8:00 AM CDT)      Last Vitamin D:      Component  Ref Range & Units 6/10/24  9:49 AM   Vitamin D, 25OH, Total  30.0 - 100.0 ng/mL 21.2 Low

## 2024-06-28 ENCOUNTER — TELEPHONE (OUTPATIENT)
Dept: SURGERY | Facility: CLINIC | Age: 29
End: 2024-06-28

## 2024-06-28 NOTE — TELEPHONE ENCOUNTER
Called patient to follow-up on appointment with IR for US guided biopsy of LLQ abdominal wall lesion. Patient states she is hoping to schedule by end of July. States she is in process of making ride arrangements. Advised patient to schedule as soon as she can and to call back to let us know. All questions answered, advised to call back for any future questions or concerns. Patient verbalized understanding.

## 2024-07-24 NOTE — TELEPHONE ENCOUNTER
A refill request was received for:  Requested Prescriptions     Pending Prescriptions Disp Refills    ergocalciferol 1.25 MG (95270 UT) Oral Cap 12 capsule 1     Sig: Take 1 capsule (50,000 Units total) by mouth once a week.     Last refill date: 6/27/24    Last office visit:   6/10/24    Future Appointments   Date Time Provider Department Center   10/29/2024 12:20 PM Charanjit Arnett MD [facility] [address]

## 2024-07-25 RX ORDER — ERGOCALCIFEROL 1.25 MG/1
50000 CAPSULE ORAL WEEKLY
Qty: 12 CAPSULE | Refills: 1 | Status: SHIPPED | OUTPATIENT
Start: 2024-07-25

## 2024-08-01 ENCOUNTER — LAB ENCOUNTER (OUTPATIENT)
Dept: LAB | Facility: HOSPITAL | Age: 29
End: 2024-08-01
Attending: STUDENT IN AN ORGANIZED HEALTH CARE EDUCATION/TRAINING PROGRAM
Payer: COMMERCIAL

## 2024-08-01 ENCOUNTER — TELEPHONE (OUTPATIENT)
Dept: INTERVENTIONAL RADIOLOGY/VASCULAR | Facility: HOSPITAL | Age: 29
End: 2024-08-01

## 2024-08-01 DIAGNOSIS — Z01.818 PRE-OP TESTING: ICD-10-CM

## 2024-08-01 LAB
BASOPHILS # BLD AUTO: 0.02 X10(3) UL (ref 0–0.2)
BASOPHILS NFR BLD AUTO: 0.4 %
DEPRECATED RDW RBC AUTO: 38.2 FL (ref 35.1–46.3)
EOSINOPHIL # BLD AUTO: 0.08 X10(3) UL (ref 0–0.7)
EOSINOPHIL NFR BLD AUTO: 1.4 %
ERYTHROCYTE [DISTWIDTH] IN BLOOD BY AUTOMATED COUNT: 13.2 % (ref 11–15)
HCT VFR BLD AUTO: 34.8 %
HGB BLD-MCNC: 11.4 G/DL
IMM GRANULOCYTES # BLD AUTO: 0.02 X10(3) UL (ref 0–1)
IMM GRANULOCYTES NFR BLD: 0.4 %
LYMPHOCYTES # BLD AUTO: 2.05 X10(3) UL (ref 1–4)
LYMPHOCYTES NFR BLD AUTO: 36 %
MCH RBC QN AUTO: 26.1 PG (ref 26–34)
MCHC RBC AUTO-ENTMCNC: 32.8 G/DL (ref 31–37)
MCV RBC AUTO: 79.8 FL
MONOCYTES # BLD AUTO: 0.33 X10(3) UL (ref 0.1–1)
MONOCYTES NFR BLD AUTO: 5.8 %
NEUTROPHILS # BLD AUTO: 3.2 X10 (3) UL (ref 1.5–7.7)
NEUTROPHILS # BLD AUTO: 3.2 X10(3) UL (ref 1.5–7.7)
NEUTROPHILS NFR BLD AUTO: 56 %
PLATELET # BLD AUTO: 261 10(3)UL (ref 150–450)
RBC # BLD AUTO: 4.36 X10(6)UL
WBC # BLD AUTO: 5.7 X10(3) UL (ref 4–11)

## 2024-08-01 PROCEDURE — 85025 COMPLETE CBC W/AUTO DIFF WBC: CPT

## 2024-08-01 PROCEDURE — 36415 COLL VENOUS BLD VENIPUNCTURE: CPT

## 2024-08-05 ENCOUNTER — PATIENT MESSAGE (OUTPATIENT)
Dept: SURGERY | Facility: CLINIC | Age: 29
End: 2024-08-05

## 2024-08-05 ENCOUNTER — HOSPITAL ENCOUNTER (OUTPATIENT)
Dept: INTERVENTIONAL RADIOLOGY/VASCULAR | Facility: HOSPITAL | Age: 29
Discharge: HOME | End: 2024-08-05
Attending: PHYSICIAN ASSISTANT | Admitting: STUDENT IN AN ORGANIZED HEALTH CARE EDUCATION/TRAINING PROGRAM
Payer: COMMERCIAL

## 2024-08-05 VITALS
WEIGHT: 146 LBS | TEMPERATURE: 97 F | SYSTOLIC BLOOD PRESSURE: 108 MMHG | DIASTOLIC BLOOD PRESSURE: 78 MMHG | RESPIRATION RATE: 17 BRPM | BODY MASS INDEX: 26.87 KG/M2 | HEIGHT: 62 IN | HEART RATE: 75 BPM | OXYGEN SATURATION: 99 %

## 2024-08-05 DIAGNOSIS — R19.04 ABDOMINAL WALL MASS OF LEFT LOWER QUADRANT: ICD-10-CM

## 2024-08-05 DIAGNOSIS — Z01.818 PRE-OP TESTING: Primary | ICD-10-CM

## 2024-08-05 LAB — B-HCG UR QL: NEGATIVE

## 2024-08-05 PROCEDURE — 88334 PATH CONSLTJ SURG CYTO XM EA: CPT | Performed by: PHYSICIAN ASSISTANT

## 2024-08-05 PROCEDURE — 0WBF3ZX EXCISION OF ABDOMINAL WALL, PERCUTANEOUS APPROACH, DIAGNOSTIC: ICD-10-PCS | Performed by: RADIOLOGY

## 2024-08-05 PROCEDURE — 81025 URINE PREGNANCY TEST: CPT

## 2024-08-05 PROCEDURE — 88333 PATH CONSLTJ SURG CYTO XM 1: CPT | Performed by: PHYSICIAN ASSISTANT

## 2024-08-05 PROCEDURE — 88305 TISSUE EXAM BY PATHOLOGIST: CPT | Performed by: PHYSICIAN ASSISTANT

## 2024-08-05 PROCEDURE — 99152 MOD SED SAME PHYS/QHP 5/>YRS: CPT | Performed by: RADIOLOGY

## 2024-08-05 PROCEDURE — 76942 ECHO GUIDE FOR BIOPSY: CPT | Performed by: RADIOLOGY

## 2024-08-05 PROCEDURE — 49180 BIOPSY ABDOMINAL MASS: CPT | Performed by: RADIOLOGY

## 2024-08-05 PROCEDURE — 36415 COLL VENOUS BLD VENIPUNCTURE: CPT

## 2024-08-05 RX ORDER — LIDOCAINE HYDROCHLORIDE 20 MG/ML
INJECTION, SOLUTION EPIDURAL; INFILTRATION; INTRACAUDAL; PERINEURAL
Status: DISCONTINUED
Start: 2024-08-05 | End: 2024-08-05 | Stop reason: WASHOUT

## 2024-08-05 RX ORDER — MIDAZOLAM HYDROCHLORIDE 1 MG/ML
INJECTION INTRAMUSCULAR; INTRAVENOUS
Status: COMPLETED
Start: 2024-08-05 | End: 2024-08-05

## 2024-08-05 RX ORDER — MULTIVITAMIN WITH IRON
500 TABLET ORAL DAILY
COMMUNITY

## 2024-08-05 RX ORDER — SODIUM CHLORIDE 9 MG/ML
INJECTION, SOLUTION INTRAVENOUS CONTINUOUS
Status: DISCONTINUED | OUTPATIENT
Start: 2024-08-05 | End: 2024-08-05

## 2024-08-05 RX ADMIN — SODIUM CHLORIDE: 9 INJECTION, SOLUTION INTRAVENOUS at 08:30:00

## 2024-08-05 NOTE — PROCEDURES
[facility]  part of [facility]  Procedure Note    Joan Sandy Patient Status:  Outpatient    1995 MRN [id number]   Location [facility] INTERVENTIONAL SUITES Attending Mickie Ritchie PA-C   Hosp Day # 0 PCP Charanjit Arnett MD     Procedure: Ultrasound guided core biopsy of subcutaneous mass of abdominal wall    Pre-Procedure Diagnosis: Abdominal wall mass of left lower quadrant [R19.04]      Post-Procedure Diagnosis: Abdominal wall mass of left lower quadrant [R19.04]    Anesthesia:  Local and Sedation    Findings:  Under ultrasound guidance, three 18 core biopsy samples were obtained from LLQ abdominal wall lesion.  Adequacy confirmed by pathology.    Specimens: Three 18G core to pathology.    Blood Loss:  2mL      Complications:  None    Plan:  Bedrest one hour.    RYAN AVITIA MD  2024

## 2024-08-05 NOTE — TELEPHONE ENCOUNTER
Spoke to patient regarding message below. Patient agreeable to continue with follow-up appointment on 8/13/24 with Dr. Denise. All questions answered, advised to call back for any future questions or concerns. Patient verbalized understanding.     ----- Message from [employer]  sent at 8/5/2024  4:41 PM CDT -----  Regarding: Follow up appt   Contact: [phone]  Hello,    Considering the results are shown as benign how necessary is my follow up appointment? Is there pre cancerous matieral found in the biopsy I’m a bit confused. I have appointment set up with an endo specialist this month.

## 2024-08-05 NOTE — PROGRESS NOTES
Pt is able to sit up and ambulate without any difficulty. Procedure site remains dry and intact with no signs of bleeding and hematoma. Pt tolerated food/fluid. Instructions given. Pt/Family verbalized understanding.

## 2024-08-05 NOTE — DISCHARGE INSTRUCTIONS
INTERVENTIONAL RADIOLOGY  [facility]  [phone]     Patient Name:  Joan Sandy    Procedure:  Left Lower Abdominal Biopsy    Site Care: Remove your band-aid or dressing in 24 hours.  Gently wash area with soap and water.                                       Activity/Diet  No heavy lifting or strenuous activity for 48 hours.  Drink plenty of fluids, unless you have otherwise been told to restrict your fluid intake.  Do not drink alcohol for 24 hours.  Do not drive,  operate heavy machinery, make important decisions or sign legal documents today.    Medications:  Take acetaminophen if needed for pain. Do not exceed 4000mg of acetaminophen in a 24-hour period. and Make no changes to your existing medications.    Contact Interventional Radiology at [phone] if you have severe/unrelieved pain, fever, chills, dizziness/lightheadedness, or drainage/bleeding from your incision site.

## 2024-08-05 NOTE — BRIEF PROCEDURE NOTE
Patient in holding room 8. Patient A+MD Foreign to bedside to obtain consent and explain procedure. Patient lying supine and draped in sterile fashion. Patient received 2mg Versed and 50mcg of Fentanyl for moderate sedation, and 4mL Lidocaine for local anesthetic. MD obtained samples via 3 passes with Cytology confirmation. Site clean, dry, and intact without signs of hematoma, bleeding, or swelling. Covered with sterile bandage. Report given to Shilpa HAZEL.

## 2024-08-05 NOTE — PRE-SEDATION ASSESSMENT
[facility]  part of [facility] Pre-Procedure Sedation Assessment    History of snoring or sleep or apnea?   No    History of previous problems with anesthesia or sedation  No    Physical Findings:  Neck: nl ROM  CV: regular rate  PULM: normal respiratory rate/effort    Mallampati Score:  II (hard and soft palate, upper portion of tonsils anduvula visible)    ASA Classification:   2. Patient with mild systemic disease    Plan:   -IV moderate sedation    RYAN AVITIA MD

## 2024-08-05 NOTE — H&P
[facility]  part of [facility]   History & Physical    Joan Sandy Patient Status:  Outpatient    1995 MRN [id number]   Location [facility] INTERVENTIONAL SUITES Attending Mickie Ritchie PA-C   Hosp Day # 0 PCP Charanjit Arnett MD     Admitting Diagnosis:   Abdominal wall mass    History of Present Illness:   30y/o woman with LLQ abdominal wall mass.      History   Past Medical History:  Past Medical History:    Arrhythmia    Heart disease       Past Surgical History:  Past Surgical History:   Procedure Laterality Date          Tonsillectomy         Social History:  Social History     Tobacco Use    Smoking status: Never     Passive exposure: Never    Smokeless tobacco: Never   Substance Use Topics    Alcohol use: Not Currently        Family History:  Family History   Problem Relation Age of Onset    Heart Disease Father     High Cholesterol Father     Cancer Father     Fibromyalgia Mother     Arthritis Mother     High Cholesterol Mother        Allergies/Medications:   Allergies:  Allergies   Allergen Reactions    Codeine HIVES and OTHER (SEE COMMENTS)       Medications:  No current outpatient medications on file.    Physical Exam & Review of Systems:   Physical Exam:    /66   Pulse 72   Temp 96.6 °F (35.9 °C) (Temporal)   Resp 18   Ht 62\"   Wt 146 lb (66.2 kg)   LMP 2024 (Approximate)   SpO2 97%   BMI 26.70 kg/m²     General: NAD  Neck: No JVD  Lungs: CTA bilat  Heart: RRR, S1, S2  Abdomen: Soft, NT/ND, BS+x4  Extremities: Warm, dry, no LE edema bilat  Pulses: 2+ bilat DP    Results:   Labs:  Recent Labs   Lab 24  1058   RBC 4.36   HGB 11.4*   HCT 34.8*   MCV 79.8*   MCH 26.1   MCHC 32.8   RDW 13.2   NEPRELIM 3.20   WBC 5.7   .0     No results for input(s): \"PTP\", \"INR\", \"PTT\" in the last 168 hours.  No results for input(s): \"GLU\", \"BUN\", \"CREATSERUM\", \"GFRAA\", \"GFRNAA\", \"CA\", \"NA\", \"K\", \"CL\", \"CO2\" in the last 168  hours.    Assessment/Plan:   Impression: 28y/o woman with LLQ abdominal wall mass.    Recommendations: Ultrasound guided core biopsy of LLQ abdominal wall mass.    RYAN AVITIA MD  8/5/2024  9:57 AM

## 2024-08-13 ENCOUNTER — OFFICE VISIT (OUTPATIENT)
Dept: SURGERY | Facility: CLINIC | Age: 29
End: 2024-08-13
Payer: COMMERCIAL

## 2024-08-13 VITALS
SYSTOLIC BLOOD PRESSURE: 137 MMHG | DIASTOLIC BLOOD PRESSURE: 90 MMHG | WEIGHT: 150.19 LBS | RESPIRATION RATE: 20 BRPM | TEMPERATURE: 99 F | HEART RATE: 114 BPM | BODY MASS INDEX: 27 KG/M2

## 2024-08-13 DIAGNOSIS — N80.129 ENDOMETRIOMA: Primary | ICD-10-CM

## 2024-08-13 PROCEDURE — 3080F DIAST BP >= 90 MM HG: CPT | Performed by: SURGERY

## 2024-08-13 PROCEDURE — 3075F SYST BP GE 130 - 139MM HG: CPT | Performed by: SURGERY

## 2024-08-20 NOTE — PROGRESS NOTES
Patient returns for follow-up.  She underwent ultrasound-guided biopsy successfully.  Pathology consistent with endometrioma.  She had several questions which I tried to address to the best my ability.  I do recommend resection.  She is interested in obtaining a second opinion from an endometriosis specialist which I encouraged.  For now, she would like to hold off on having it resected which is perfectly reasonable.  She may follow-up with me on an as-needed basis.    Minh Denise MD  Complex General Surgical Oncology  [facility]  [email or web address]

## 2024-08-23 RX ORDER — ERGOCALCIFEROL 1.25 MG/1
50000 CAPSULE, LIQUID FILLED ORAL WEEKLY
Qty: 12 CAPSULE | Refills: 1 | OUTPATIENT
Start: 2024-08-23

## 2024-10-29 ENCOUNTER — OFFICE VISIT (OUTPATIENT)
Dept: INTERNAL MEDICINE CLINIC | Facility: CLINIC | Age: 29
End: 2024-10-29
Payer: COMMERCIAL

## 2024-10-29 VITALS
SYSTOLIC BLOOD PRESSURE: 122 MMHG | WEIGHT: 151 LBS | OXYGEN SATURATION: 99 % | BODY MASS INDEX: 27.79 KG/M2 | HEIGHT: 62 IN | HEART RATE: 85 BPM | DIASTOLIC BLOOD PRESSURE: 76 MMHG

## 2024-10-29 DIAGNOSIS — Z76.89 SLEEP CONCERN: ICD-10-CM

## 2024-10-29 DIAGNOSIS — E66.3 OVERWEIGHT (BMI 25.0-29.9): ICD-10-CM

## 2024-10-29 DIAGNOSIS — R73.03 PREDIABETES: Primary | ICD-10-CM

## 2024-10-29 DIAGNOSIS — E78.5 HYPERLIPIDEMIA, UNSPECIFIED HYPERLIPIDEMIA TYPE: ICD-10-CM

## 2024-10-29 PROCEDURE — 3074F SYST BP LT 130 MM HG: CPT | Performed by: INTERNAL MEDICINE

## 2024-10-29 PROCEDURE — 99215 OFFICE O/P EST HI 40 MIN: CPT | Performed by: INTERNAL MEDICINE

## 2024-10-29 PROCEDURE — 3078F DIAST BP <80 MM HG: CPT | Performed by: INTERNAL MEDICINE

## 2024-10-29 PROCEDURE — 3008F BODY MASS INDEX DOCD: CPT | Performed by: INTERNAL MEDICINE

## 2024-10-29 RX ORDER — NALTREXONE HYDROCHLORIDE AND BUPROPION HYDROCHLORIDE 8; 90 MG/1; MG/1
TABLET, EXTENDED RELEASE ORAL
Qty: 120 TABLET | Refills: 0 | Status: SHIPPED | OUTPATIENT
Start: 2024-10-29 | End: 2024-11-28

## 2024-10-29 NOTE — PATIENT INSTRUCTIONS
JANAY [facility]iscoecome to [facility]. We are excited that you are committed to improving your health and have invited our practice to be part of your journey. Our approach to the medical management of weight loss is similar to that of other chronic diseases, like asthma or high blood pressure. Treatment is tailored to your needs and may look different than someone else in our program. Weight-loss success is dependent on many factors, including your motivation and commitment to better health.      We are committed to your medical safety, particularly when prescribing weight-loss medications. Because we are physicians, we measure your success not only by “pounds lost” - we will also track improvements in your laboratory work, vital signs and quality of life.      Weight loss and maintenance can be a challenging endeavor. We want to celebrate your successes and support you if you encounter difficult times. Please don’t hesitate to ask us questions and share with us any struggles you may have. For some patients, there may be many attempts at weight loss before lasting success is found, but we can help you find your success!      Sincerely,     Charanjit Arnett MD  American Board of Obesity Medicine Diplomate  American Board of Internal Medicine Diplomate                                                                  Weight Loss Tips    Cut down on sugar and starches.    Ok to eat healthy fat ( avocado, nuts,eggs, olive oil and coconut oil)    Eat protein with each meal target 15-30 G of protein with each meal: Protein reduces appetite, cravings and hunger. It increases muscle mass and subsequently metabolism and fat burning.     If not able to meet your protein need, you can get a protein shake. Choose one with around 25 g of protein and low carb less than 5 g ( e.g: premier protein, orgain Clean Protein, whey protein muscle milk)    Limit carbohydrates between 50g-100g / day    Limit processed food, eat  unprocessed food whenever you can.    Read nutrition labels    Drink a lot of water  at least 65 oz of water per day: Water is a natural appetite suppressant, increases calorie burning and help you to loose weight.    Eat slowly.    Do not drink your calories ( avoid soft drinks, juice, high calorie coffee drinks, limit alcohol) Also stay away from artificially sweetened beverages too it can cause weight gain.    Think about challenges and write it down to address it next visit.     Do not eat late at night.      Exercise goal for weight loss is 150 minutes per week.    Take a probiotic everyday ( e.g: vences colon health brand, culturelle, VSL3, Lactobacillus Gasseri )     Use smart phone applications to track your progress/ carb intake e.g:( my fitnesspal, loose it, Carb Manager )    Have a good night sleep aim for 7-8 hours    Reduce your stress level.    Helpful websites: www.dietAbzena.Stratoscale( search visual low-carb guides dietdoctor), or www.HiGear.Stratoscale.    Helpful exercise apps( Calpano rosy)   Helpful fasting apps :( Zero)         Foods to avoid :  Sugar: sweets, ice cream, fruit juices, candy and food that is high in added sugar.  Highly processed food  Refined grains: Rice, wheat, bread, cereal and pasta.  Starchy vegetables: Potatoes and corn     Low Carb food :  Meat: lamb, steak, chicken, turkey  Fish and sea food   Eggs  Plain yogurt   Cheese   Fat and oils   Fruits: berries are the best           How I Plan to Lose Weight      Goal setting is the “how” of weight loss. Motivators are the “why.” When setting goals, utilize the SMART technique:    SMART Technique Example   Specific Who, what, where, when, how… “I want to lose 10 pounds in two months.”   Measureable How will you track? 10 pounds in 8 weeks = 1.25 pounds/week   Attainable Resources you have available, previous experience “I have been able to do this before, and now I have new tools from my doctor!”   Relevant Why this goal is important  Review your motivators above   Timely Set benchmarks and deadlines “Focusing for two month intervals works for me.”         Even if your lose 0.5 pound per week You will still lose 26                       pounds by this time next year!

## 2024-10-29 NOTE — PROGRESS NOTES
Joan Sandy is a 29 year old female.  Chief complaint:  weight loss management and obesity related comorbidities    HPI:     Joan Sandy is a 29 year old female new to our office today.   with PMH as listed below here for weight management     Weight history:    Wt Readings from Last 12 Encounters:   10/29/24 151 lb (68.5 kg)   08/13/24 150 lb 3.2 oz (68.1 kg)   07/30/24 146 lb (66.2 kg)   06/18/24 142 lb 6.4 oz (64.6 kg)   06/10/24 144 lb 12.8 oz (65.7 kg)   01/30/24 140 lb (63.5 kg)   05/09/23 146 lb 6.4 oz (66.4 kg)   03/23/23 141 lb (64 kg)   02/01/23 143 lb 3.2 oz (65 kg)   08/17/22 140 lb (63.5 kg)   06/07/22 147 lb 9.6 oz (67 kg)   01/11/22 155 lb (70.3 kg)     Weight:  Max weight:right now   Lowest weight: 114 lbs   Average weight : 123     Triggers for weight gain? Not sure what is causing her weight gain    Previous weight loss programs? No       Maximum Weight loss with the program? No     Previous weight loss medications? Compounded ozempic   For 4 months ago 2 years ago     Do you get up to eat at night? No    Typical diet   Breakfast: Lunch: Dinner: Snacks:   Same breakfast   2 eggs   [place] pepper with Labna    Varies   Sometimes she skips lunch    Protein   For the past 2 years   Baked chicken and broccoli   Sweet potatoes   Cashews   Nuts    Has been focusing on protein and low carb     Sweet tooth: No  Soda or Juice: No  Activity: 4 times per week   Strength training 2ce per week and the rest cardio     Sleep: 4 hours of sleep   No known snoring       Stress: 7/10     Significant PMH/ or weight Related Co morbidities : HL, predm  History of thyroid disease: No  History of thyroid nodule: No  Family history of thyroid cancer: dad had thyroid cancer and his sibilings had thyroid issues ( but not cacer)       History of pancreatitis : no  History of kidney stone: No   History of glaucoma: No  History of heart disease: mild valvular heart disease    Seizure: No          Sleeping issues   Wakes up at 2 am    Sleeps only for 4-5 hours     Current Outpatient Medications   Medication Sig Dispense Refill    magnesium 250 MG Oral Tab Take 2 tablets (500 mg total) by mouth daily.      ergocalciferol 1.25 MG (21377 UT) Oral Cap Take 1 capsule (50,000 Units total) by mouth once a week. 12 capsule 1    Ferrous Sulfate 325 (65 Fe) MG Oral Tab Take 1 tablet (325 mg total) by mouth daily.      Multiple Vitamins-Iron (DAILY-VITAMIN/IRON) Oral Tab Take 1 tablet by mouth. 265mg         Past Medical History:    Arrhythmia    Heart disease       Past Surgical History:   Procedure Laterality Date          Tonsillectomy         Patient Active Problem List   Diagnosis    Mesenteric adenitis    Abdominal bloating    Elevated lipase    Supervision of normal first pregnancy (HCC)    Antepartum anemia (HCC)    Cardiac valvular malformation (HCC)    Postpartum anemia (HCC)    SOB (shortness of breath)    Other fatigue    Atypical chest pain    Speech and language deficits    Abnormal EKG    Atrial tachycardia (HCC)    BMI 25.0-25.9,adult    Overweight (BMI 25.0-29.9)    Pelvic lump    Pelvic pain    Cyst of ovary    Vitamin D deficiency    Annual physical exam    Irregular menstrual cycle    Nausea and vomiting               REVIEW OF SYSTEMS:   A comprehensive 10 point review of systems was completed.  Pertinent positives and negatives noted in the HPI      PHYSICAL EXAM:   /76   Pulse 85   Ht 5' 2\" (1.575 m)   Wt 151 lb (68.5 kg)   LMP 2024 (Approximate)   SpO2 99%   BMI 27.62 kg/m²   [unfilled]    Wt Readings from Last 6 Encounters:   10/29/24 151 lb (68.5 kg)   24 150 lb 3.2 oz (68.1 kg)   24 146 lb (66.2 kg)   24 142 lb 6.4 oz (64.6 kg)   06/10/24 144 lb 12.8 oz (65.7 kg)   24 140 lb (63.5 kg)        GENERAL: well developed, well nourished,in no apparent distress  NECK: supple,no adenopathy  LUNGS: clear to auscultation  CARDIO: RRR without murmur  NEURO: no gross deficits     No  orders of the defined types were placed in this encounter.    No orders of the defined types were placed in this encounter.      ASSESSMENT/PLAN:       ICD-10-CM    1. Prediabetes  R73.03 Naltrexone-buPROPion HCl ER (CONTRAVE) 8-90 MG Oral Tablet 12 Hr     Pulmonary Referral - In Network     Lipid Panel [E]     Hemoglobin A1C (Glycohemoglobin) [E]      2. Sleep concern  Z76.89 Naltrexone-buPROPion HCl ER (CONTRAVE) 8-90 MG Oral Tablet 12 Hr     Pulmonary Referral - In Network     Lipid Panel [E]     Hemoglobin A1C (Glycohemoglobin) [E]      3. Hyperlipidemia, unspecified hyperlipidemia type  E78.5 Naltrexone-buPROPion HCl ER (CONTRAVE) 8-90 MG Oral Tablet 12 Hr     Pulmonary Referral - In Network     Lipid Panel [E]     Hemoglobin A1C (Glycohemoglobin) [E]      4. Overweight (BMI 25.0-29.9)  E66.3 Naltrexone-buPROPion HCl ER (CONTRAVE) 8-90 MG Oral Tablet 12 Hr     Pulmonary Referral - In Network     Lipid Panel [E]     Hemoglobin A1C (Glycohemoglobin) [E]           Reviewed  Readiness for Lifestyle change: 10  /10, Interest in Medication: 10   /10, Surgery interest: 0/10.    Counseled on comprehensive weight loss plan including attention to nutrition, exercise and behavior/stress management for success.     Plan:  Advised the patient to follow low carb high protein diet   Advised to count carbs goal [id number] g per day   Advised to increase protein goal is 90 g per day   Can add protein shakes   Increase water intake goal is 64 oz per day   Start probiotics   Increase exercise cardio and weight resistance training, discussed the importance of exercise for weight loss goal is 150 min per week   don't eat at late night   Given meal plan   Discussed resources for low carb meal   Discussed the importance of sleep and reducing stress for weight loss  Given low carb meal plan  Goal weight loss is 7 lbs in 3 months   Starting Contrave discussed side effects and how to use the medication   Advised to check with dad is  thyroid cancer was medullary thyroid cancer and to check with insurance if they cover Glp1/ GIP injections   Repeat blood work before the end of the year    Referral to sleep specialist for a sleep study   Discussed:  -low carb high protein  -Limit carbohydrates  -Read nutrition labels and keep a food log  -drink a lot of water 65 oz of water per day  - Do not drink your calories (no regular pop, juice, high calorie coffee drinks, limit alcohol)  - Do not eat late at night  - Exercise- at least 3 times per week ( goal is 150 min/week)  -dietician referral: No  -Labs as ordered: Yes        Follow up with PCP as scheduled.   Follow up with Charanjit Arnett 1 mos      Advised to schedule weight loss appt Q 1 month x 3     Please return to the clinic if you are having persistent or worsening symptoms   Charanjit Arnett MD,   Diplomate of the American Board of Internal Medicine  Diplomate of the American Board of Obesity Medicine

## 2024-11-22 RX ORDER — ERGOCALCIFEROL 1.25 MG/1
50000 CAPSULE, LIQUID FILLED ORAL WEEKLY
Qty: 12 CAPSULE | Refills: 1 | Status: SHIPPED | OUTPATIENT
Start: 2024-11-22

## 2024-12-22 ENCOUNTER — OFFICE VISIT (OUTPATIENT)
Dept: FAMILY MEDICINE CLINIC | Facility: CLINIC | Age: 29
End: 2024-12-22
Payer: COMMERCIAL

## 2024-12-22 VITALS
BODY MASS INDEX: 27.6 KG/M2 | TEMPERATURE: 99 F | RESPIRATION RATE: 18 BRPM | HEIGHT: 62 IN | SYSTOLIC BLOOD PRESSURE: 120 MMHG | WEIGHT: 150 LBS | OXYGEN SATURATION: 99 % | HEART RATE: 94 BPM | DIASTOLIC BLOOD PRESSURE: 84 MMHG

## 2024-12-22 DIAGNOSIS — J22 LOWER RESPIRATORY INFECTION: Primary | ICD-10-CM

## 2024-12-22 PROCEDURE — 3074F SYST BP LT 130 MM HG: CPT | Performed by: NURSE PRACTITIONER

## 2024-12-22 PROCEDURE — 3079F DIAST BP 80-89 MM HG: CPT | Performed by: NURSE PRACTITIONER

## 2024-12-22 PROCEDURE — 3008F BODY MASS INDEX DOCD: CPT | Performed by: NURSE PRACTITIONER

## 2024-12-22 PROCEDURE — 87637 SARSCOV2&INF A&B&RSV AMP PRB: CPT | Performed by: NURSE PRACTITIONER

## 2024-12-22 PROCEDURE — 99213 OFFICE O/P EST LOW 20 MIN: CPT | Performed by: NURSE PRACTITIONER

## 2024-12-22 RX ORDER — AZITHROMYCIN 250 MG/1
TABLET, FILM COATED ORAL
Qty: 6 TABLET | Refills: 0 | Status: SHIPPED | OUTPATIENT
Start: 2024-12-22 | End: 2024-12-27

## 2024-12-22 NOTE — PROGRESS NOTES
CHIEF COMPLAINT:     Chief Complaint   Patient presents with    Cough     Week w/ white bumps on tongue, cough, slight fever ,congestion and sore throat       HPI:   Joan Sandy is a 29 year old female who presents for upper respiratory symptoms for  1 weeks. Patient reports  cough, slight fever, congestion, yellow/green nasal discharge, sinus pressure, ear pain, sore throat, and white bumps on tongue . Symptoms have been persisting since onset.  Treating symptoms with mucinex max, ibuprofen, vitamin C.   Associated symptoms include see above.     was sick with similar symptoms 1.5 week ago.     Eating and drinking well.     Current Outpatient Medications   Medication Sig Dispense Refill    azithromycin 250 MG Oral Tab Take 2 tablets (500 mg total) by mouth daily for 1 day, THEN 1 tablet (250 mg total) daily for 4 days. 6 tablet 0    amoxicillin clavulanate 875-125 MG Oral Tab Take 1 tablet by mouth 2 (two) times daily for 7 days. 14 tablet 0    ergocalciferol 1.25 MG (67678 UT) Oral Cap Take 1 capsule (50,000 Units total) by mouth once a week. 12 capsule 1    magnesium 250 MG Oral Tab Take 2 tablets (500 mg total) by mouth daily.      Ferrous Sulfate 325 (65 Fe) MG Oral Tab Take 1 tablet (325 mg total) by mouth daily.      Multiple Vitamins-Iron (DAILY-VITAMIN/IRON) Oral Tab Take 1 tablet by mouth. 265mg        Past Medical History:    Arrhythmia    Heart disease      Past Surgical History:   Procedure Laterality Date          Tonsillectomy           Social History     Socioeconomic History    Marital status:    Tobacco Use    Smoking status: Never     Passive exposure: Never    Smokeless tobacco: Never   Vaping Use    Vaping status: Never Used   Substance and Sexual Activity    Alcohol use: Not Currently    Drug use: Never    Sexual activity: Yes     Partners: Male   Other Topics Concern    Breast feeding No    Reaction to local anesthetic No    Pt has a pacemaker No    Pt has a  defibrillator No     Social Drivers of Health      Received from [facility]         REVIEW OF SYSTEMS:   GENERAL: good appetite  SKIN: no rashes or abnormal skin lesions  HEENT: See HPI  LUNGS: denies shortness of breath or wheezing, See HPI  CARDIOVASCULAR: denies chest pain   GI: denies N/V/C/D, upper stomach throbbing.   NEURO: + headaches    EXAM:   /84   Pulse 94   Temp 99.2 °F (37.3 °C)   Resp 18   Ht 5' 2\" (1.575 m)   Wt 150 lb (68 kg)   LMP 01/28/2024 (Approximate)   SpO2 99%   BMI 27.44 kg/m²   GENERAL: well developed, well nourished,in no apparent distress  SKIN: no rashes,no suspicious lesions  HEAD: atraumatic, normocephalic.  No tenderness on palpation of  sinuses  EYES: conjunctiva clear, EOM intact  EARS: TM's gray, no bulging, no retraction,no  fluid, bony landmarks visible  NOSE: Nostrils patent, clear nasal discharge, nasal mucosa pink   THROAT: Oral mucosa pink, moist. Posterior pharynx is not erythematous. no exudates. Tonsils 0/4. aphthous ulcers on tongue.   NECK: Supple, non-tender  LUNGS: Decreased right lower lung sounds Breathing is non labored.  CARDIO: RRR without murmur  EXTREMITIES: no cyanosis, clubbing or edema  LYMPH:  no lymphadenopathy.        ASSESSMENT AND PLAN:   Joan Sandy is a 29 year old female who presents with upper respiratory symptoms that are consistent with    ASSESSMENT:   Encounter Diagnosis   Name Primary?    Lower respiratory infection Yes       PLAN: Meds as below.  Comfort care as described in Patient Instructions    Meds & Refills for this Visit:  Requested Prescriptions     Signed Prescriptions Disp Refills    azithromycin 250 MG Oral Tab 6 tablet 0     Sig: Take 2 tablets (500 mg total) by mouth daily for 1 day, THEN 1 tablet (250 mg total) daily for 4 days.    amoxicillin clavulanate 875-125 MG Oral Tab 14 tablet 0     Sig: Take 1 tablet by mouth 2 (two) times daily for 7 days.     Rx azithromycin and Augmentin as directed.      Improved should be noted in 1-2 days. If worsening seek emergency care. IF persisting follow up with PCP.     Quad resp panel sent.     Discussed s/s of worsening infection/condition with Patient and importance of prompt medical re-evaluation including when to seek emergency care. Patient  voiced understanding    Increase fluids and rest. Humidified air. Warm steamy showers.     May consider OTC tylenol or ibuprofen as needed and directed on packaging for pain/fever    May consider OTC guaifenesin as needed and directed on packaging to thin mucus secretions.    May consider OTC dextromethorphan as needed and directed on packaging as a cough suppressant     May consider OTC pseudoephedrine as needed and directed on packaging as a nasal decongestant    May consider OTC Cepacol throat lozenges as needed and directed on packaging for sore throat.     Risks, benefits, and side effects of medication discussed. Patient  verbalized understanding and agreement with treatment plan.     All questions and concerns addressed. Encouraged Patient  to call clinic with any questions or concerns. I explained to the patient that emergent conditions may arise and to go to the ER for new, worsening or any persistent conditions.   Patient Instructions   See attached patient care instructions.      The patient indicates understanding of these issues and agrees to the plan.  The patient is asked to return if sx's persist or worsen.

## 2024-12-23 ENCOUNTER — PATIENT MESSAGE (OUTPATIENT)
Dept: INTERNAL MEDICINE CLINIC | Facility: CLINIC | Age: 29
End: 2024-12-23

## 2024-12-23 ENCOUNTER — OFFICE VISIT (OUTPATIENT)
Dept: INTERNAL MEDICINE CLINIC | Facility: CLINIC | Age: 29
End: 2024-12-23
Payer: COMMERCIAL

## 2024-12-23 ENCOUNTER — NURSE TRIAGE (OUTPATIENT)
Dept: INTERNAL MEDICINE CLINIC | Facility: CLINIC | Age: 29
End: 2024-12-23

## 2024-12-23 VITALS
OXYGEN SATURATION: 98 % | WEIGHT: 150 LBS | BODY MASS INDEX: 27.6 KG/M2 | SYSTOLIC BLOOD PRESSURE: 120 MMHG | HEIGHT: 62 IN | DIASTOLIC BLOOD PRESSURE: 78 MMHG | TEMPERATURE: 98 F | HEART RATE: 90 BPM

## 2024-12-23 DIAGNOSIS — R60.0 LEG EDEMA, LEFT: Primary | ICD-10-CM

## 2024-12-23 PROBLEM — K44.9 HIATAL HERNIA: Status: ACTIVE | Noted: 2022-01-14

## 2024-12-23 PROBLEM — K44.9 HIATAL HERNIA: Status: ACTIVE | Noted: 2024-12-23

## 2024-12-23 LAB
FLUAV + FLUBV RNA SPEC NAA+PROBE: NOT DETECTED
FLUAV + FLUBV RNA SPEC NAA+PROBE: NOT DETECTED
RSV RNA SPEC NAA+PROBE: NOT DETECTED
SARS-COV-2 RNA RESP QL NAA+PROBE: NOT DETECTED

## 2024-12-24 NOTE — PATIENT INSTRUCTIONS
Left leg ultrasound ordered.  Start on Aspirin 81 mg x 4 tablets today then 1 tablet/day starting tomorrow.  If develop pain numbness or tingling in foot or foot appears more bluish or gray or cool, then go to emergency department.    Do deep breathing exercises, such as 4-second inhale, 4- second hold, and a 4-second controlled exhale, pause and then repeat, doing this for a total of 4 times  Finish antibiotics-azithromycin and Augmentin

## 2024-12-24 NOTE — PROGRESS NOTES
CHIEF COMPLAINT:     Chief Complaint   Patient presents with    Pneumonia     X1 days     Edema     Last night had edema left foot     Headache       HPI:   Joan Sandy is a 29 year old female who presents with URI symptoms for about 10 days.  Was seen in the walk-in clinic and was empirically started on azithromycin and Augmentin for clinical diagnosis of pneumonia.  Had been on vacation and recently flew back with a 5-hour flight  Last evening and today she noticed that her left forefoot seemed swollen. Left foot swelling has resolved now.  Denies pain in calf nor any part of left leg    Per note from Dr. TACHO WALKER, [facility]:   \"Normal echocardiogram and stress test January 2022 at [facility]    RESULTS: 1/17/2022  Name Result Date Location - Ordered By   Trans Thoracic Echocardiogram 1.The study quality is average. The left ventricle is normal in size. Global left ventricular systolic function is normal. The left ventricular ejection fraction is 63%. Left ventricular diastolic function is normal.2.The anterior mitral leaflet is mildly thickened. Mild (1+) mitral regurgitation.3.Mild (1+) tricuspid regurgitation.4.Mild (1+) pulmonic regurgitation.5.Right ventricular systolic function is mildly decreased.     .    Earlier Echocardiogram, while pregnant on 6/24/21 showed  Mild to moderate Pulm Valve Regurg without stenosis  and mild Mitral and Tricuspid Valvular changes ( See chart review-[facility] EMR)    1/14/2022, Had normal bilat lower leg dopplers    Current Outpatient Medications   Medication Sig Dispense Refill    azithromycin 250 MG Oral Tab Take 2 tablets (500 mg total) by mouth daily for 1 day, THEN 1 tablet (250 mg total) daily for 4 days. 6 tablet 0    amoxicillin clavulanate 875-125 MG Oral Tab Take 1 tablet by mouth 2 (two) times daily for 7 days. 14 tablet 0    ergocalciferol 1.25 MG (87100 UT) Oral Cap Take 1 capsule (50,000 Units total) by mouth once a week. 12 capsule 1    magnesium 250  MG Oral Tab Take 2 tablets (500 mg total) by mouth daily.      Ferrous Sulfate 325 (65 Fe) MG Oral Tab Take 1 tablet (325 mg total) by mouth daily.      Multiple Vitamins-Iron (DAILY-VITAMIN/IRON) Oral Tab Take 1 tablet by mouth. 265mg        Past Medical History:    Arrhythmia    Heart disease      Social History:  Social History     Socioeconomic History    Marital status:    Tobacco Use    Smoking status: Never     Passive exposure: Never    Smokeless tobacco: Never   Vaping Use    Vaping status: Never Used   Substance and Sexual Activity    Alcohol use: Not Currently    Drug use: Never    Sexual activity: Yes     Partners: Male   Other Topics Concern    Breast feeding No    Reaction to local anesthetic No    Pt has a pacemaker No    Pt has a defibrillator No     Social Drivers of Health      [facility]        REVIEW OF SYSTEMS:   GENERAL: Denies fever, chills,  SKIN: Denies rashes or bruises  CHEST: Denies chest pain, or palpitations  LUNGS: Denies shortness of breath, cough  MUSCULOSKELETAL: no arthralgia or swollen joints  NEURO: Denies headaches or lightheadedness    EXAM:   /78   Pulse 90   Temp 97.8 °F (36.6 °C)   Ht 5' 2\" (1.575 m)   Wt 150 lb (68 kg)   LMP 01/28/2024 (Approximate)   SpO2 98%   BMI 27.44 kg/m²   Vitals:    12/23/24 1545   BP: 120/78   Pulse: 90   Temp: 97.8 °F (36.6 °C)   SpO2: 98%   Weight: 150 lb (68 kg)   Height: 5' 2\" (1.575 m)       GENERAL: well developed, well nourished,in no apparent distress, Oriented.  SKIN: no rashes,no suspicious lesions  HEAD: atraumatic, normocephalic  THROAT: oral mucosa pink, moist. No visible dental caries. Posterior pharynx is not erythematous. No tonsillar exudates.  NECK: supple, non-tender  LUNGS: clear to auscultation bilaterally, no wheezes or rhonchi. Breathing is non labored.  CARDIO: RRR without murmur, carotids without bruit  EXTREMITIES: no clubbing or edema.  Left toes feel cooler than right  toes.  Left plantar foot capillary refill is about 3 seconds.  No left calf pain, no thigh or groin pain  LYMPH:  no inguinal tenderness or lymph nodes    ASSESSMENT AND PLAN:     ASSESSMENT/PLAN:    1. Leg edema, left    - US VENOUS DOPPLER LEG LEFT - DIAG IMG (CPT=93971); Future  Possible phlebitis-started on aspirin 81 mg x 4 tablets today.  Continue with aspirin 81 mg daily until Doppler studies completed.  If leg swelling recurs or leg pain or coolness, then go to emergency department for further evaluation.    No orders of the defined types were placed in this encounter.          Patient Instructions   Left leg ultrasound ordered.  Start on Aspirin 81 mg x 4 tablets today then 1 tablet/day starting tomorrow.  If develop pain numbness or tingling in foot or foot appears more bluish or gray or cool, then go to emergency department.    Do deep breathing exercises, such as 4-second inhale, 4- second hold, and a 4-second controlled exhale, pause and then repeat, doing this for a total of 4 times    MELA Rogers      The patient indicates understanding of these issues and agrees to the plan.

## 2025-02-03 ENCOUNTER — OFFICE VISIT (OUTPATIENT)
Dept: OBGYN CLINIC | Facility: CLINIC | Age: 30
End: 2025-02-03
Payer: COMMERCIAL

## 2025-02-03 VITALS
SYSTOLIC BLOOD PRESSURE: 131 MMHG | BODY MASS INDEX: 27.97 KG/M2 | HEART RATE: 93 BPM | WEIGHT: 152 LBS | DIASTOLIC BLOOD PRESSURE: 84 MMHG | HEIGHT: 62 IN

## 2025-02-03 DIAGNOSIS — Q52.129 LONGITUDINAL VAGINAL SEPTUM: ICD-10-CM

## 2025-02-03 DIAGNOSIS — N80.C19 ENDOMETRIOSIS OF ANTERIOR ABDOMINAL WALL: ICD-10-CM

## 2025-02-03 DIAGNOSIS — Z01.411 ENCOUNTER FOR GYNECOLOGICAL EXAMINATION WITH ABNORMAL FINDING: Primary | ICD-10-CM

## 2025-02-03 DIAGNOSIS — Z12.4 SCREENING FOR CERVICAL CANCER: ICD-10-CM

## 2025-02-03 PROCEDURE — 87624 HPV HI-RISK TYP POOLED RSLT: CPT | Performed by: OBSTETRICS & GYNECOLOGY

## 2025-02-03 NOTE — PROGRESS NOTES
Joan Sandy is a 29 year old female  Patient's last menstrual period was 2025 (approximate).    Chief Complaint   Patient presents with    Gyn Exam     ANNUAL EXAM   .     Patient is new to me.  Reviewed her online records related to her endometriosis in detail. She had a c/s in  and developed pain and a lump that would increase in size during menses immediately above the left side of her c/s scar.  In 2024 she finally had a biopsy and abd wall endometriosis was found.  Patient stats that prior to this she did not have endometriosis symptoms.  She saw a gyne onc at [facility] in 2024 and Orlissa vs resection of the endometrial implant was offered but patient declined due to she wants to get pregnant.  An abd wall MRI was recommended at [facility] but she has not done this- I encouraged her to do it due to it could affect her closure at the time of subsequent c/s and surg onc may need to be present- especially if fascia is involved.  Patient expressed understanding.      OBSTETRICS HISTORY:  OB History    Para Term  AB Living   1 1 1 0 0 1   SAB IAB Ectopic Multiple Live Births   0 0 0 0 1       GYNE HISTORY:   Pap Date: 22  Pap Result Notes: NEG PAP / NEG HPV      MEDICAL HISTORY:  Past Medical History:    Arrhythmia    Heart disease    Hiatal hernia     Past Surgical History:   Procedure Laterality Date          Tonsillectomy           SOCIAL HISTORY:  Social History     Socioeconomic History    Marital status:    Tobacco Use    Smoking status: Never     Passive exposure: Never    Smokeless tobacco: Never   Vaping Use    Vaping status: Never Used   Substance and Sexual Activity    Alcohol use: Not Currently    Drug use: Never    Sexual activity: Yes     Partners: Male   Other Topics Concern    Breast feeding No    Reaction to local anesthetic No    Pt has a pacemaker No    Pt has a defibrillator No        FAMILY HISTORY:  Family History   Problem Relation Age of  Onset    Heart Disease Father     High Cholesterol Father     Cancer Father     Fibromyalgia Mother     Arthritis Mother     High Cholesterol Mother        MEDICATIONS:    Current Outpatient Medications:     ergocalciferol 1.25 MG (03837 UT) Oral Cap, Take 1 capsule (50,000 Units total) by mouth once a week., Disp: 12 capsule, Rfl: 1    magnesium 250 MG Oral Tab, Take 2 tablets (500 mg total) by mouth daily., Disp: , Rfl:     Ferrous Sulfate 325 (65 Fe) MG Oral Tab, Take 1 tablet (325 mg total) by mouth daily., Disp: , Rfl:     Multiple Vitamins-Iron (DAILY-VITAMIN/IRON) Oral Tab, Take 1 tablet by mouth. 265mg, Disp: , Rfl:     ALLERGIES:  Allergies[1]    Blood pressure 131/84, pulse 93, height 5' 2\" (1.575 m), weight 152 lb (68.9 kg), last menstrual period 01/22/2025, not currently breastfeeding.    Review of Systems:  Constitutional:  Denies fatigue, night sweats, hot flashes  Eyes:  denies blurred or double vision  Cardiovascular:  denies chest pain or palpitations  Respiratory:  denies shortness of breath  Gastrointestinal:  denies heartburn, abdominal pain, diarrhea or constipation  Genitourinary:  denies dysuria, incontinence, abnormal vaginal discharge, vaginal itching  Musculoskeletal:  denies back pain.  Skin/Breast:  Denies any breast pain, lumps, or discharge.   Neurological:  denies headaches, extremity weakness or numbness.  Psychiatric: denies depression or anxiety.  Endocrine:   denies excessive thirst or urination.  Heme/Lymph:  denies history of anemia, easy bruising or bleeding.    Depression Screening (PHQ-2/PHQ-9): Over the LAST 2 WEEKS   Little interest or pleasure in doing things: Not at all    Feeling down, depressed, or hopeless: Not at all    PHQ-2 SCORE: 0           PHYSICAL EXAM:   Constitutional: well developed, well nourished  Head/Face: normocephalic  Neck/Thyroid: thyroid symmetric, no thyromegaly, no nodules, no adenopathy  Lymphatic:no abnormal supraclavicular or axillary adenopathy  is noted  Breast: normal without palpable masses, tenderness, asymmetry, nipple discharge, nipple retraction or skin changes  Respiratory:  lungs clear to auscultation bilaterally  Cardiovascular: regular rate and rhythm, no significant murmur  Abdomen:  soft, nontender, nondistended, no masses  Skin/Hair: no unusual rashes or bruises  Extremities: no edema, no cyanosis  Psychiatric:  Oriented to time, place, person and situation. 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    Encounter for gynecological examination with abnormal finding Yes    Endometriosis of anterior abdominal wall     Longitudinal vaginal septum     Screening for cervical cancer      ASCCP guidelines discussed,cotest done,rtc 1 year for annual exam   SBE encouraged  Plan as above- 20 minutes spent in reviewing her records and discussion of above in addition to her annual exam.  Orders Placed This Encounter   Procedures    Hpv Dna  High Risk , Thin Prep Collect    ThinPrep PAP Smear       Requested Prescriptions      No prescriptions  requested or ordered in this encounter       None                  [1]   Allergies  Allergen Reactions    Codeine HIVES and OTHER (SEE COMMENTS)

## 2025-02-04 LAB — HPV E6+E7 MRNA CVX QL NAA+PROBE: NEGATIVE

## 2025-02-05 LAB — LAST PAP RESULT: NORMAL

## 2025-02-07 ENCOUNTER — PATIENT MESSAGE (OUTPATIENT)
Dept: OBGYN CLINIC | Facility: CLINIC | Age: 30
End: 2025-02-07

## 2025-02-07 NOTE — TELEPHONE ENCOUNTER
I would prefer that she see a gyne/onc or a general surgeon to order this test- It would be best to just have it done where she was already seen so that surgeon can give their recommendations and interpret the results.  Repairs of this type would be out of a gynecologist scope of practice.

## 2025-02-07 NOTE — TELEPHONE ENCOUNTER
Last annual/ office visit with Dr. Summers on 2/3/25    Per Dr. Summers office note:    \"An abd wall MRI was recommended at [facility] but she has not done this- I encouraged her to do it due to it could affect her closure at the time of subsequent c/s and surg onc may need to be present- especially if fascia is involved. Patient expressed understanding\".     Message to Dr. Summers to please review patient's mychart and advise if ok to  place order to be done through [facility]. Thank you.

## 2025-02-09 ENCOUNTER — PATIENT MESSAGE (OUTPATIENT)
Dept: INTERNAL MEDICINE CLINIC | Facility: CLINIC | Age: 30
End: 2025-02-09

## 2025-02-09 DIAGNOSIS — T50.901A MEDICATION OVERDOSE, ACCIDENTAL OR UNINTENTIONAL, INITIAL ENCOUNTER: Primary | ICD-10-CM

## 2025-02-12 RX ORDER — ERGOCALCIFEROL 1.25 MG/1
50000 CAPSULE, LIQUID FILLED ORAL WEEKLY
Qty: 12 CAPSULE | Refills: 1 | Status: SHIPPED | OUTPATIENT
Start: 2025-02-12

## 2025-02-12 NOTE — TELEPHONE ENCOUNTER
Patient comment: Whenever i  this medication there is only 4 capsules but freddy says 12 capsules ?     Future Appt. 03/10/2025 Bebetowil     Last Visit: 12/23/2024 NUPUR Mo     Medication Requested:  Requested Prescriptions     Pending Prescriptions Disp Refills    ergocalciferol 1.25 MG (58035 UT) Oral Cap 12 capsule 1     Sig: Take 1 capsule (50,000 Units total) by mouth once a week.      Last refill:        Disp Refills Start End     ergocalciferol 1.25 MG (94626 UT) Oral Cap 12 capsule 1 11/22/2024 --    Sig - Route: Take 1 capsule (50,000 Units total) by mouth once a week. - Oral

## 2025-02-18 ENCOUNTER — OFFICE VISIT (OUTPATIENT)
Dept: FAMILY MEDICINE CLINIC | Facility: CLINIC | Age: 30
End: 2025-02-18
Payer: COMMERCIAL

## 2025-02-18 VITALS
WEIGHT: 152 LBS | TEMPERATURE: 99 F | DIASTOLIC BLOOD PRESSURE: 86 MMHG | OXYGEN SATURATION: 99 % | BODY MASS INDEX: 27.97 KG/M2 | HEIGHT: 62 IN | RESPIRATION RATE: 18 BRPM | HEART RATE: 103 BPM | SYSTOLIC BLOOD PRESSURE: 134 MMHG

## 2025-02-18 DIAGNOSIS — R05.1 ACUTE COUGH: Primary | ICD-10-CM

## 2025-02-18 LAB
CONTROL LINE PRESENT WITH A CLEAR BACKGROUND (YES/NO): YES YES/NO
KIT LOT #: NORMAL NUMERIC
PREGNANCY TEST, URINE: NEGATIVE

## 2025-02-18 PROCEDURE — 3008F BODY MASS INDEX DOCD: CPT | Performed by: NURSE PRACTITIONER

## 2025-02-18 PROCEDURE — 87637 SARSCOV2&INF A&B&RSV AMP PRB: CPT | Performed by: NURSE PRACTITIONER

## 2025-02-18 PROCEDURE — 99214 OFFICE O/P EST MOD 30 MIN: CPT | Performed by: NURSE PRACTITIONER

## 2025-02-18 PROCEDURE — 3079F DIAST BP 80-89 MM HG: CPT | Performed by: NURSE PRACTITIONER

## 2025-02-18 PROCEDURE — 3075F SYST BP GE 130 - 139MM HG: CPT | Performed by: NURSE PRACTITIONER

## 2025-02-18 PROCEDURE — 81025 URINE PREGNANCY TEST: CPT | Performed by: NURSE PRACTITIONER

## 2025-02-18 RX ORDER — ALBUTEROL SULFATE 90 UG/1
2 INHALANT RESPIRATORY (INHALATION)
Qty: 1 EACH | Refills: 0 | Status: SHIPPED | OUTPATIENT
Start: 2025-02-18

## 2025-02-18 RX ORDER — BENZONATATE 200 MG/1
200 CAPSULE ORAL 3 TIMES DAILY PRN
Qty: 15 CAPSULE | Refills: 0 | Status: SHIPPED | OUTPATIENT
Start: 2025-02-18

## 2025-02-19 ENCOUNTER — HOSPITAL ENCOUNTER (OUTPATIENT)
Dept: GENERAL RADIOLOGY | Age: 30
Discharge: HOME | End: 2025-02-19
Attending: NURSE PRACTITIONER
Payer: COMMERCIAL

## 2025-02-19 ENCOUNTER — PATIENT MESSAGE (OUTPATIENT)
Dept: INTERNAL MEDICINE CLINIC | Facility: CLINIC | Age: 30
End: 2025-02-19

## 2025-02-19 DIAGNOSIS — R05.1 ACUTE COUGH: ICD-10-CM

## 2025-02-19 LAB
FLUAV + FLUBV RNA SPEC NAA+PROBE: DETECTED
FLUAV + FLUBV RNA SPEC NAA+PROBE: NOT DETECTED
RSV RNA SPEC NAA+PROBE: NOT DETECTED
SARS-COV-2 RNA RESP QL NAA+PROBE: DETECTED

## 2025-02-19 PROCEDURE — 71046 X-RAY EXAM CHEST 2 VIEWS: CPT | Performed by: NURSE PRACTITIONER

## 2025-02-19 NOTE — PROGRESS NOTES
CHIEF COMPLAINT:     Chief Complaint   Patient presents with    Cough     9 days w/ Fever, green mucus, extreme throat pain, dry cough and  congestion         HPI:   Joan Sandy is a 29 year old female who presents for upper respiratory symptoms for  9 days. Patient reports  low grade fever, congestion yellow mucous .  Felt better for a day.  However,  came home from [facility] with an illness then she started with new sore throat, dry cough and ongoing congestion with greenish mucous x 1 day.  3 y/o son also developed same symptoms.  Denies fever, chills, headache, body aches, facial pain.  Symptoms have been mild since onset.  Treating symptoms with afrin, benadryl, and alternating ibuprofen/tylenol, last dose 3 hrs ago.       Had pneumonia in Dec.    Other conditions:   BMI: Body mass index is 27.8 kg/m².  Pregnant:No, but was due for menses and would like a pregnancy test.  Breastfeeding: no  Heart Disease: no  DM: no  Chronic Lung Disease: no  Chronic Kidney Disease: no  Liver Disease: no  Immunocompromised: (Transplant, Heme-Onc, Chronic Steroid Use): no    Current Outpatient Medications   Medication Sig Dispense Refill    ergocalciferol 1.25 MG (90617 UT) Oral Cap Take 1 capsule (50,000 Units total) by mouth once a week. 12 capsule 1    magnesium 250 MG Oral Tab Take 2 tablets (500 mg total) by mouth daily.      Ferrous Sulfate 325 (65 Fe) MG Oral Tab Take 1 tablet (325 mg total) by mouth daily.      Multiple Vitamins-Iron (DAILY-VITAMIN/IRON) Oral Tab Take 1 tablet by mouth. 265mg        Past Medical History:    Arrhythmia    Heart disease    Hiatal hernia      Past Surgical History:   Procedure Laterality Date          Tonsillectomy           Social History     Socioeconomic History    Marital status:    Tobacco Use    Smoking status: Never     Passive exposure: Never    Smokeless tobacco: Never   Vaping Use    Vaping status: Never Used   Substance and Sexual Activity    Alcohol use:  Not Currently    Drug use: Never    Sexual activity: Yes     Partners: Male   Other Topics Concern    Breast feeding No    Reaction to local anesthetic No    Pt has a pacemaker No    Pt has a defibrillator No     Social Drivers of Health      Received from [facility]         REVIEW OF SYSTEMS:   GENERAL: feels well otherwise,   normal appetite  SKIN: no rashes or abnormal skin lesions  HEENT: See HPI  LUNGS: denies shortness of breath or wheezing, See HPI  CARDIOVASCULAR: denies chest pain or palpitations   GI: denies N/V/C or abdominal pain  NEURO: denies dizziness or lightheadedness    EXAM:   /86   Pulse 103   Temp 99.1 °F (37.3 °C)   Resp 18   Ht 5' 2\" (1.575 m)   Wt 152 lb (68.9 kg)   LMP 01/22/2025 (Approximate)   SpO2 99%   BMI 27.80 kg/m²     Physical Exam  Vitals reviewed.   Constitutional:       General: She is not in acute distress.     Appearance: Normal appearance. She is not ill-appearing.   HENT:      Head: Normocephalic and atraumatic.      Right Ear: Tympanic membrane and ear canal normal.      Left Ear: Tympanic membrane and ear canal normal.      Nose: Congestion and rhinorrhea present. Rhinorrhea is clear.      Right Sinus: No maxillary sinus tenderness or frontal sinus tenderness.      Left Sinus: No maxillary sinus tenderness or frontal sinus tenderness.      Mouth/Throat:      Lips: Pink.      Mouth: Mucous membranes are moist.      Pharynx: Oropharynx is clear. Uvula midline. Postnasal drip present. No posterior oropharyngeal erythema.      Tonsils: No tonsillar exudate.      Comments: Tonsils absent  Eyes:      Extraocular Movements: Extraocular movements intact.      Conjunctiva/sclera: Conjunctivae normal.   Cardiovascular:      Rate and Rhythm: Normal rate and regular rhythm.      Heart sounds: Normal heart sounds. No murmur heard.  Pulmonary:      Effort: Pulmonary effort is normal.      Breath sounds: Decreased air movement (slightly on lower lobes >  right) present. No wheezing, rhonchi or rales.   Abdominal:      Palpations: Abdomen is soft. There is no hepatomegaly or splenomegaly.      Tenderness: There is no abdominal tenderness.   Musculoskeletal:      Cervical back: Normal range of motion and neck supple.   Lymphadenopathy:      Cervical: No cervical adenopathy.   Skin:     General: Skin is warm and dry.   Neurological:      General: No focal deficit present.      Mental Status: She is alert.   Psychiatric:         Speech: Speech normal.         Behavior: Behavior normal. Behavior is cooperative.          Recent Results (from the past 24 hours)   Urine Pregnancy Test    Collection Time: 02/18/25  6:58 PM   Result Value Ref Range    Pregnancy Test, Urine negative     Control Line Present with a clear background (yes/no) yes Yes/No    Kit Lot # [id number] Numeric    Kit Expiration Date 02/03/2026 Date       ASSESSMENT AND PLAN:   Joan Sandy is a 29 year old female who presents with     ASSESSMENT:   Encounter Diagnosis   Name Primary?    Acute cough Yes       PLAN:   Discussed likely new viral etiology.  Will send quad test.  Will check chest XR for pneumonia.  Patient plans to go in the morning. To treat if indicated.  Reviewed symptom relief measures with patient.   Monitor for worsening symptoms.  Comfort care as described in Patient Instructions  Medications as below.      Meds & Refills for this Visit:  Requested Prescriptions     Signed Prescriptions Disp Refills    benzonatate 200 MG Oral Cap 15 capsule 0     Sig: Take 1 capsule (200 mg total) by mouth 3 (three) times daily as needed.    albuterol 108 (90 Base) MCG/ACT Inhalation Aero Soln 1 each 0     Sig: Inhale 2 puffs into the lungs every 4 to 6 hours as needed.       Risks, benefits, and side effects of medication explained and discussed.  To f/u with PCP if sx do not resolve as anticipated.  The patient indicates understanding of these issues and agrees to the plan.        Patient Instructions   If  prescribed, take antibiotics as directed. Finish all the medication even if you feel better.   Probiotics or 1-2 servings of yogurt daily during antibiotic use will help decrease stomach upset and restore good bacteria to the gut/prevent antibiotic associated diarrhea. Separate times by at least 2-4 hours.    General comfort measures:  Get rest!  Hydrate! (cold or hot based on comfort). Drink lots of water or other non dehydrating liquids to help with illness. Salty foods, soups and tea can help with throat pain.   Hand washing-use hand  or wash hands frequently, cover your cough or sneeze, do not share towels or drinks with others.  Salt water gargles (1 tsp. Salt in 6 oz lukewarm water): gargle for 2 minutes, repeat every 15 minutes as needed to help decrease swelling and relieve pain.  Use humidified air, steamy showers/baths and use vaporizer in sleeping quarters to keep secretions thin.  Avoid smoking.

## 2025-02-19 NOTE — PATIENT INSTRUCTIONS
If prescribed, take antibiotics as directed. Finish all the medication even if you feel better.   Probiotics or 1-2 servings of yogurt daily during antibiotic use will help decrease stomach upset and restore good bacteria to the gut/prevent antibiotic associated diarrhea. Separate times by at least 2-4 hours.    General comfort measures:  Get rest!  Hydrate! (cold or hot based on comfort). Drink lots of water or other non dehydrating liquids to help with illness. Salty foods, soups and tea can help with throat pain.   Hand washing-use hand  or wash hands frequently, cover your cough or sneeze, do not share towels or drinks with others.  Salt water gargles (1 tsp. Salt in 6 oz lukewarm water): gargle for 2 minutes, repeat every 15 minutes as needed to help decrease swelling and relieve pain.  Use humidified air, steamy showers/baths and use vaporizer in sleeping quarters to keep secretions thin.  Avoid smoking.

## 2025-02-20 RX ORDER — OSELTAMIVIR PHOSPHATE 75 MG/1
75 CAPSULE ORAL 2 TIMES DAILY
Qty: 10 CAPSULE | Refills: 0 | Status: SHIPPED | OUTPATIENT
Start: 2025-02-20 | End: 2025-02-25

## 2025-03-31 ENCOUNTER — TELEPHONE (OUTPATIENT)
Dept: OBGYN CLINIC | Facility: CLINIC | Age: 30
End: 2025-03-31

## 2025-03-31 NOTE — TELEPHONE ENCOUNTER
Last menstrual period 2/20, cycles every 23 days.  Patient agrees to see all providers.  OB Nurse Education scheduled on 4/16 and New Prenatal scheduled on 4/28 with Dr. Summers.  Patient taking pnv.  Patient to call with questions or concerns. Patient requested [facility] be sent with all doctor's names listed.  Mychart sent.    Based on visit on 2/18:  Ht: 5'2\"  Wt: 152  BMI: 27.79

## 2025-04-07 ENCOUNTER — HOSPITAL ENCOUNTER (OUTPATIENT)
Dept: ULTRASOUND IMAGING | Facility: HOSPITAL | Age: 30
Discharge: HOME | End: 2025-04-07
Payer: COMMERCIAL

## 2025-04-07 DIAGNOSIS — R60.0 LEG EDEMA, LEFT: ICD-10-CM

## 2025-04-07 PROCEDURE — 93971 EXTREMITY STUDY: CPT

## 2025-07-05 ENCOUNTER — PATIENT MESSAGE (OUTPATIENT)
Dept: INTERNAL MEDICINE CLINIC | Facility: CLINIC | Age: 30
End: 2025-07-05

## 2025-07-07 ENCOUNTER — PATIENT OUTREACH (OUTPATIENT)
Age: 30
End: 2025-07-07

## 2025-07-08 ENCOUNTER — NURSE TRIAGE (OUTPATIENT)
Dept: INTERNAL MEDICINE CLINIC | Facility: CLINIC | Age: 30
End: 2025-07-08

## 2025-07-08 NOTE — TELEPHONE ENCOUNTER
Spoke with patient. She states that she was seen at her OB. She was advised to follow up with primary in regards to her constipation. She is not taking prenatal vitamins. What do you want to do for her?  Reason for Disposition   Abdomen is more swollen than usual    Answer Assessment - Initial Assessment Questions  1. STOOL PATTERN OR FREQUENCY: \"How often do you have a bowel movement (BM)?\"  (Normal range: 3 times a day to every 3 days)  \"When was your last BM?\"       Once every other day  2. STRAINING: \"Do you have to strain to have a BM?\"       Yes   3. RECTAL PAIN: \"Does your rectum hurt when the stool comes out?\" If Yes, ask: \"Do you have hemorrhoids? How bad is the pain?\"  (Scale 1-10; or mild, moderate, severe)      No   4. STOOL COMPOSITION: \"Are the stools hard?\"       Hard and pebble like   5. BLOOD ON STOOLS: \"Has there been any blood on the toilet tissue or on the surface of the BM?\" If Yes, ask: \"When was the last time?\"      No   6. CHRONIC CONSTIPATION: \"Is this a new problem for you?\"  If No, ask: \"How long have you had this problem?\" (days, weeks, months)       Since may   7. CHANGES IN DIET OR HYDRATION: \"Have there been any recent changes in your diet?\" \"How much fluids are you drinking on a daily basis?\"  \"How much have you had to drink today?\"    32 ounces of water   8. MEDICINES: \"Have you been taking any new medicines?\" \"Are you taking any narcotic pain medicines?\" (e.g., Dilaudid, morphine, Percocet, Vicodin)      No   9. LAXATIVES: \"Have you been using any stool softeners, laxatives, or enemas?\"  If Yes, ask \"What, how often, and when was the last time?\"      Senna   10. ACTIVITY:  \"How much walking do you do every day?\"  \"Has your activity level decreased in the past week?\"         No   11. CAUSE: \"What do you think is causing the constipation?\"         Constipation   12. OTHER SYMPTOMS: \"Do you have any other symptoms?\" (e.g., abdomen pain, bloating, fever, vomiting)      Last stool  6/30/25  Stomach pain 4/10   13. MEDICAL HISTORY: \"Do you have a history of hemorrhoids, rectal fissures, or rectal surgery or rectal abscess?\"          No   14. PREGNANCY: \"Is there any chance you are pregnant?\" \"When was your last menstrual period?\"        19 weeks    Protocols used: Constipation-A-[place]

## (undated) DIAGNOSIS — R41.3 MEMORY DEFICIT: Primary | ICD-10-CM

## (undated) DIAGNOSIS — Z12.83 SKIN CANCER SCREENING: Primary | ICD-10-CM

## (undated) DIAGNOSIS — R47.89 WORD FINDING DIFFICULTY: ICD-10-CM